# Patient Record
Sex: FEMALE | Race: OTHER | URBAN - METROPOLITAN AREA
[De-identification: names, ages, dates, MRNs, and addresses within clinical notes are randomized per-mention and may not be internally consistent; named-entity substitution may affect disease eponyms.]

---

## 2020-01-22 ENCOUNTER — OFFICE VISIT (OUTPATIENT)
Dept: GASTROENTEROLOGY | Facility: CLINIC | Age: 30
End: 2020-01-22
Payer: COMMERCIAL

## 2020-01-22 VITALS
BODY MASS INDEX: 24.92 KG/M2 | SYSTOLIC BLOOD PRESSURE: 115 MMHG | HEIGHT: 64 IN | HEART RATE: 63 BPM | WEIGHT: 146 LBS | DIASTOLIC BLOOD PRESSURE: 72 MMHG

## 2020-01-22 DIAGNOSIS — K58.1 IRRITABLE BOWEL SYNDROME WITH CONSTIPATION: Primary | ICD-10-CM

## 2020-01-22 PROCEDURE — 99203 OFFICE O/P NEW LOW 30 MIN: CPT | Performed by: INTERNAL MEDICINE

## 2020-01-22 NOTE — PATIENT INSTRUCTIONS
1.  Use the FODMAP diet as a guide regarding avoiding foods (FODMAPs) that may cause excessive gas. 2.  Please have your doctor obtain either a celiac panel (total IgA antibody and TTG IgA level) along with a TSH (thyroid test) if not done previously.

## 2020-01-23 NOTE — PROGRESS NOTES
HPI:    Patient ID: Juanita Paniagua is a 30year old female. HPI  The patient is self-referred on the advice of a coworker (CC). The patient has a very longstanding history of constipation and abdominal bloating.   Review of the [facility] feature She is oriented to person, place, and time. She appears well-developed and well-nourished. No distress. Healthy in appearance   HENT:   Head: Normocephalic and atraumatic. Mouth/Throat: No oropharyngeal exudate.    Eyes: Conjunctivae are normal. No scle [id number]

## 2021-07-27 ENCOUNTER — OFFICE VISIT (OUTPATIENT)
Dept: FAMILY MEDICINE CLINIC | Facility: CLINIC | Age: 31
End: 2021-07-27
Payer: COMMERCIAL

## 2021-07-27 VITALS
WEIGHT: 149 LBS | OXYGEN SATURATION: 98 % | HEIGHT: 64 IN | DIASTOLIC BLOOD PRESSURE: 60 MMHG | SYSTOLIC BLOOD PRESSURE: 110 MMHG | BODY MASS INDEX: 25.44 KG/M2 | HEART RATE: 60 BPM

## 2021-07-27 DIAGNOSIS — K58.1 IRRITABLE BOWEL SYNDROME WITH CONSTIPATION: ICD-10-CM

## 2021-07-27 DIAGNOSIS — Z00.00 WELLNESS EXAMINATION: Primary | ICD-10-CM

## 2021-07-27 LAB
ALBUMIN SERPL-MCNC: 4.1 G/DL (ref 3.4–5)
ALBUMIN/GLOB SERPL: 1.1 {RATIO} (ref 1–2)
ALP LIVER SERPL-CCNC: 75 U/L
ALT SERPL-CCNC: 39 U/L
ANION GAP SERPL CALC-SCNC: 10 MMOL/L (ref 0–18)
AST SERPL-CCNC: 23 U/L (ref 15–37)
BASOPHILS # BLD AUTO: 0.05 X10(3) UL (ref 0–0.2)
BASOPHILS NFR BLD AUTO: 0.6 %
BILIRUB SERPL-MCNC: 0.4 MG/DL (ref 0.1–2)
BUN BLD-MCNC: 11 MG/DL (ref 7–18)
BUN/CREAT SERPL: 14.9 (ref 10–20)
CALCIUM BLD-MCNC: 9 MG/DL (ref 8.5–10.1)
CHLORIDE SERPL-SCNC: 103 MMOL/L (ref 98–112)
CHOLEST SMN-MCNC: 206 MG/DL (ref ?–200)
CO2 SERPL-SCNC: 25 MMOL/L (ref 21–32)
CREAT BLD-MCNC: 0.74 MG/DL
DEPRECATED RDW RBC AUTO: 48.2 FL (ref 35.1–46.3)
EOSINOPHIL # BLD AUTO: 0.2 X10(3) UL (ref 0–0.7)
EOSINOPHIL NFR BLD AUTO: 2.3 %
ERYTHROCYTE [DISTWIDTH] IN BLOOD BY AUTOMATED COUNT: 14 % (ref 11–15)
EST. AVERAGE GLUCOSE BLD GHB EST-MCNC: 103 MG/DL (ref 68–126)
GLOBULIN PLAS-MCNC: 3.6 G/DL (ref 2.8–4.4)
GLUCOSE BLD-MCNC: 77 MG/DL (ref 70–99)
HBA1C MFR BLD HPLC: 5.2 % (ref ?–5.7)
HCT VFR BLD AUTO: 37.3 %
HDLC SERPL-MCNC: 54 MG/DL (ref 40–59)
HGB BLD-MCNC: 12.3 G/DL
IMM GRANULOCYTES # BLD AUTO: 0.02 X10(3) UL (ref 0–1)
IMM GRANULOCYTES NFR BLD: 0.2 %
LDLC SERPL CALC-MCNC: 99 MG/DL (ref ?–100)
LYMPHOCYTES # BLD AUTO: 2.7 X10(3) UL (ref 1–4)
LYMPHOCYTES NFR BLD AUTO: 30.5 %
M PROTEIN MFR SERPL ELPH: 7.7 G/DL (ref 6.4–8.2)
MCH RBC QN AUTO: 30.8 PG (ref 26–34)
MCHC RBC AUTO-ENTMCNC: 33 G/DL (ref 31–37)
MCV RBC AUTO: 93.5 FL
MONOCYTES # BLD AUTO: 0.61 X10(3) UL (ref 0.1–1)
MONOCYTES NFR BLD AUTO: 6.9 %
NEUTROPHILS # BLD AUTO: 5.27 X10 (3) UL (ref 1.5–7.7)
NEUTROPHILS # BLD AUTO: 5.27 X10(3) UL (ref 1.5–7.7)
NEUTROPHILS NFR BLD AUTO: 59.5 %
NONHDLC SERPL-MCNC: 152 MG/DL (ref ?–130)
OSMOLALITY SERPL CALC.SUM OF ELEC: 284 MOSM/KG (ref 275–295)
PATIENT FASTING Y/N/NP: NO
PATIENT FASTING Y/N/NP: NO
PLATELET # BLD AUTO: 343 10(3)UL (ref 150–450)
POTASSIUM SERPL-SCNC: 4 MMOL/L (ref 3.5–5.1)
RBC # BLD AUTO: 3.99 X10(6)UL
SODIUM SERPL-SCNC: 138 MMOL/L (ref 136–145)
TRIGL SERPL-MCNC: 316 MG/DL (ref 30–149)
TSI SER-ACNC: 1.33 MIU/ML (ref 0.36–3.74)
VLDLC SERPL CALC-MCNC: 53 MG/DL (ref 0–30)
WBC # BLD AUTO: 8.9 X10(3) UL (ref 4–11)

## 2021-07-27 PROCEDURE — 3074F SYST BP LT 130 MM HG: CPT | Performed by: FAMILY MEDICINE

## 2021-07-27 PROCEDURE — 99385 PREV VISIT NEW AGE 18-39: CPT | Performed by: FAMILY MEDICINE

## 2021-07-27 PROCEDURE — 80053 COMPREHEN METABOLIC PANEL: CPT | Performed by: FAMILY MEDICINE

## 2021-07-27 PROCEDURE — 85025 COMPLETE CBC W/AUTO DIFF WBC: CPT | Performed by: FAMILY MEDICINE

## 2021-07-27 PROCEDURE — 83036 HEMOGLOBIN GLYCOSYLATED A1C: CPT | Performed by: FAMILY MEDICINE

## 2021-07-27 PROCEDURE — 80061 LIPID PANEL: CPT | Performed by: FAMILY MEDICINE

## 2021-07-27 PROCEDURE — 84443 ASSAY THYROID STIM HORMONE: CPT | Performed by: FAMILY MEDICINE

## 2021-07-27 PROCEDURE — 3008F BODY MASS INDEX DOCD: CPT | Performed by: FAMILY MEDICINE

## 2021-07-27 PROCEDURE — 3078F DIAST BP <80 MM HG: CPT | Performed by: FAMILY MEDICINE

## 2021-07-27 RX ORDER — RIBOFLAVIN (VITAMIN B2) 100 MG
100 TABLET ORAL DAILY
COMMUNITY

## 2021-07-27 RX ORDER — MULTIVIT-MIN/IRON/FOLIC ACID/K 18-600-40
CAPSULE ORAL
COMMUNITY

## 2021-07-27 NOTE — PROGRESS NOTES
CC: Annual Physical Exam    HPI:   Serafin Justin is a 28year old female who presents for a complete physical exam.    HCM  -Diet:  Well-balanced.   -Exercise regularly  -Mental Health: denies any depression or anxiety sx  -Skin care:  no concerning lesi tonsil  demonstrates a smooth pink-tan homogenous architecture with no evidence of gross  tumor, hemorrhage or necrosis.   Representative sections of each tonsil are  submitted in cassettes 1 and 2.  (plc)     Kym Branch M.D./kiel           Signed:   Santos Darden (Exact Date)   SpO2 98%   BMI 25.58 kg/m²  Estimated body mass index is 25.58 kg/m² as calculated from the following:    Height as of this encounter: 5' 4\" (1.626 m). Weight as of this encounter: 149 lb (67.6 kg).    Wt Readings from Last 3 Encounters: PANEL;  Future  -     TSH W REFLEX TO FREE T4; Future  -     HEMOGLOBIN A1C; Future  -     Pt counseled with regards to diet and exercise.  -Obtain pap records    Irritable bowel syndrome with constipation  -supportive care and dietary changes discussed

## 2021-08-05 ENCOUNTER — TELEPHONE (OUTPATIENT)
Dept: FAMILY MEDICINE CLINIC | Facility: CLINIC | Age: 31
End: 2021-08-05

## 2021-08-05 NOTE — TELEPHONE ENCOUNTER
----- Message from Deya Sanabria MD sent at 7/30/2021  1:22 AM CDT -----  Please let her know  1) Your cholesterol panel especially your triglyceride is elevated.  This is reversible with a low fat diet and exercise + omega 3 fish oil    Rest are

## 2021-11-17 ENCOUNTER — OFFICE VISIT (OUTPATIENT)
Dept: FAMILY MEDICINE CLINIC | Facility: CLINIC | Age: 31
End: 2021-11-17
Payer: COMMERCIAL

## 2021-11-17 VITALS
TEMPERATURE: 98 F | WEIGHT: 150 LBS | BODY MASS INDEX: 26 KG/M2 | DIASTOLIC BLOOD PRESSURE: 72 MMHG | HEART RATE: 75 BPM | SYSTOLIC BLOOD PRESSURE: 110 MMHG | OXYGEN SATURATION: 98 %

## 2021-11-17 DIAGNOSIS — K58.1 IRRITABLE BOWEL SYNDROME WITH CONSTIPATION: Primary | ICD-10-CM

## 2021-11-17 DIAGNOSIS — Z12.4 PAP SMEAR FOR CERVICAL CANCER SCREENING: ICD-10-CM

## 2021-11-17 PROCEDURE — 99213 OFFICE O/P EST LOW 20 MIN: CPT | Performed by: FAMILY MEDICINE

## 2021-11-17 PROCEDURE — 3074F SYST BP LT 130 MM HG: CPT | Performed by: FAMILY MEDICINE

## 2021-11-17 PROCEDURE — 3078F DIAST BP <80 MM HG: CPT | Performed by: FAMILY MEDICINE

## 2021-11-17 PROCEDURE — 88175 CYTOPATH C/V AUTO FLUID REDO: CPT | Performed by: FAMILY MEDICINE

## 2021-11-17 PROCEDURE — 87624 HPV HI-RISK TYP POOLED RSLT: CPT | Performed by: FAMILY MEDICINE

## 2021-11-17 RX ORDER — DICYCLOMINE HCL 20 MG
20 TABLET ORAL EVERY 6 HOURS PRN
Qty: 20 TABLET | Refills: 1 | Status: SHIPPED | OUTPATIENT
Start: 2021-11-17

## 2021-11-17 RX ORDER — DOCUSATE SODIUM 100 MG/1
100 CAPSULE, LIQUID FILLED ORAL 2 TIMES DAILY PRN
Qty: 60 CAPSULE | Refills: 0 | Status: SHIPPED | OUTPATIENT
Start: 2021-11-17

## 2021-11-17 NOTE — PROGRESS NOTES
HPI:   Patient presents with:  Pap      Aliyah Mayen is a 28year old female presenting for:    Here for pap. On 11/2021 had +HPV. Recently went on vacation and now her IBS is flaring with bloating and constipation. Started a FOD MAP diet.       Res Absolute 5.27 1.50 - 7.70 x10(3) uL    Lymphocyte Absolute 2.70 1.00 - 4.00 x10(3) uL    Monocyte Absolute 0.61 0.10 - 1.00 x10(3) uL    Eosinophil Absolute 0.20 0.00 - 0.70 x10(3) uL    Basophil Absolute 0.05 0.00 - 0.20 x10(3) uL    Immature Granulocyte Procedure Laterality Date   • REMOVAL OF TONSILS,12+ Y/O       No Known Allergies   Social History:  Social History    Tobacco Use      Smoking status: Never Smoker      Smokeless tobacco: Never Used    Alcohol use: Yes      Comment: social    Drug use: Vagina: Normal.      Cervix: Normal.             ASSESSMENT AND PLAN:   Patient is a 28year old female who presents primarily presents for:    Diagnoses and all orders for this visit:    Irritable bowel syndrome with constipation  -     docusate sodi 07/27/2022  Influenza Vaccine Completed  COVID-19 Vaccine Completed  Pneumococcal Vaccine: Birth to Angélica Mandel MD

## 2021-11-19 ENCOUNTER — TELEPHONE (OUTPATIENT)
Dept: FAMILY MEDICINE CLINIC | Facility: CLINIC | Age: 31
End: 2021-11-19

## 2021-11-19 NOTE — TELEPHONE ENCOUNTER
We were unable to fax MR requests to [facility]. After 3 attempt we contact patient named above and she will obtain MR personally.

## 2021-11-22 NOTE — TELEPHONE ENCOUNTER
Patient states she went to [facility] office and they were giving her a hard time obtaining her MR records.

## 2021-11-27 ENCOUNTER — OFFICE VISIT (OUTPATIENT)
Dept: FAMILY MEDICINE CLINIC | Facility: CLINIC | Age: 31
End: 2021-11-27
Payer: COMMERCIAL

## 2021-11-27 VITALS
OXYGEN SATURATION: 100 % | TEMPERATURE: 98 F | DIASTOLIC BLOOD PRESSURE: 56 MMHG | HEIGHT: 64 IN | HEART RATE: 52 BPM | WEIGHT: 150 LBS | BODY MASS INDEX: 25.61 KG/M2 | SYSTOLIC BLOOD PRESSURE: 113 MMHG

## 2021-11-27 DIAGNOSIS — H69.83 EUSTACHIAN TUBE DYSFUNCTION, BILATERAL: Primary | ICD-10-CM

## 2021-11-27 PROCEDURE — 99213 OFFICE O/P EST LOW 20 MIN: CPT | Performed by: NURSE PRACTITIONER

## 2021-11-27 PROCEDURE — 3074F SYST BP LT 130 MM HG: CPT | Performed by: NURSE PRACTITIONER

## 2021-11-27 PROCEDURE — 3008F BODY MASS INDEX DOCD: CPT | Performed by: NURSE PRACTITIONER

## 2021-11-27 PROCEDURE — 3078F DIAST BP <80 MM HG: CPT | Performed by: NURSE PRACTITIONER

## 2021-11-27 NOTE — PATIENT INSTRUCTIONS
Sudafed  Flonase nasal spray  Ibuprofen  Common Middle Ear Problems  Middle ear problems may be caused by something that occurs at birth or right after birth (congenital). This may be an inherited condition.  Or it may be due to medicines or to a viral in outer or middle ear  This type of hearing loss can often be treated with medicine or surgery. Sensorineural hearing loss  This is the most common type of lifelong hearing loss. It occurs after damage to the inner ear.  It can also occur when there are prob

## 2021-11-27 NOTE — PROGRESS NOTES
CHIEF COMPLAINT:   Patient presents with:  Ear Pain: R      HPI:   Olivia Cornejo is a 28year old female who presents to clinic today with complaints of R ear discomfort. Has had for 3  days. Pain is described as more of an ache.   Patient denies histor auditory canals healthy. Right TM: clear gray, no bulging, no retraction, clear effusion, bony landmarks intact. Left TM: clear gray, no bulging, no retraction, clear effusion, bony landmarks intact.   NOSE: nostrils patent, no nasal discharge, nasal mu hearing loss:  · Conductive hearing loss. This is caused by anything that limits outside sound from getting into the inner ear. · Sensorineural hearing loss. This type affects the inner ear (cochlea) or the auditory nerve. · Mixed hearing loss.  This is a loss. Some metabolic disorders, such as diabetes, have been linked to it. If your hearing loss is unexplained, you may need tests to help find the cause.  These can include:  · Blood sugar level tests  · Complete blood count (CBC)  · Thyroid function tests

## 2022-05-20 ENCOUNTER — HOSPITAL ENCOUNTER (OUTPATIENT)
Age: 32
Discharge: HOME | End: 2022-05-20
Payer: COMMERCIAL

## 2022-05-20 VITALS
HEART RATE: 67 BPM | DIASTOLIC BLOOD PRESSURE: 78 MMHG | RESPIRATION RATE: 20 BRPM | TEMPERATURE: 99 F | SYSTOLIC BLOOD PRESSURE: 121 MMHG | OXYGEN SATURATION: 100 %

## 2022-05-20 DIAGNOSIS — H60.501 ACUTE OTITIS EXTERNA OF RIGHT EAR, UNSPECIFIED TYPE: Primary | ICD-10-CM

## 2022-05-20 RX ORDER — CIPROFLOXACIN AND DEXAMETHASONE 3; 1 MG/ML; MG/ML
4 SUSPENSION/ DROPS AURICULAR (OTIC) 2 TIMES DAILY
Qty: 1 EACH | Refills: 0 | Status: SHIPPED | OUTPATIENT
Start: 2022-05-20 | End: 2022-05-27

## 2022-05-20 NOTE — ED INITIAL ASSESSMENT (HPI)
Pt here with complaints of right ear pain that started 2 days ago , pt denies any fevers or hearing disturbance

## 2023-06-02 ENCOUNTER — OFFICE VISIT (OUTPATIENT)
Dept: INTERNAL MEDICINE CLINIC | Facility: CLINIC | Age: 33
End: 2023-06-02
Payer: COMMERCIAL

## 2023-06-02 VITALS
HEART RATE: 60 BPM | SYSTOLIC BLOOD PRESSURE: 102 MMHG | BODY MASS INDEX: 24.41 KG/M2 | OXYGEN SATURATION: 98 % | HEIGHT: 64.37 IN | WEIGHT: 143 LBS | DIASTOLIC BLOOD PRESSURE: 70 MMHG

## 2023-06-02 DIAGNOSIS — N89.8 VAGINAL DISCHARGE: ICD-10-CM

## 2023-06-02 DIAGNOSIS — Z00.00 WELLNESS EXAMINATION: Primary | ICD-10-CM

## 2023-06-02 PROCEDURE — 3008F BODY MASS INDEX DOCD: CPT | Performed by: FAMILY MEDICINE

## 2023-06-02 PROCEDURE — 87808 TRICHOMONAS ASSAY W/OPTIC: CPT | Performed by: FAMILY MEDICINE

## 2023-06-02 PROCEDURE — 99395 PREV VISIT EST AGE 18-39: CPT | Performed by: FAMILY MEDICINE

## 2023-06-02 PROCEDURE — 87106 FUNGI IDENTIFICATION YEAST: CPT | Performed by: FAMILY MEDICINE

## 2023-06-02 PROCEDURE — 87205 SMEAR GRAM STAIN: CPT | Performed by: FAMILY MEDICINE

## 2023-06-02 PROCEDURE — 99212 OFFICE O/P EST SF 10 MIN: CPT | Performed by: FAMILY MEDICINE

## 2023-06-02 PROCEDURE — 3078F DIAST BP <80 MM HG: CPT | Performed by: FAMILY MEDICINE

## 2023-06-02 PROCEDURE — 3074F SYST BP LT 130 MM HG: CPT | Performed by: FAMILY MEDICINE

## 2023-06-04 LAB
GENITAL VAGINOSIS SCREEN: NEGATIVE
TRICHOMONAS SCREEN: NEGATIVE

## 2024-03-04 ENCOUNTER — HOSPITAL ENCOUNTER (OUTPATIENT)
Age: 34
Discharge: HOME | End: 2024-03-04
Payer: COMMERCIAL

## 2024-03-04 VITALS
RESPIRATION RATE: 20 BRPM | DIASTOLIC BLOOD PRESSURE: 54 MMHG | OXYGEN SATURATION: 98 % | HEART RATE: 55 BPM | TEMPERATURE: 97 F | SYSTOLIC BLOOD PRESSURE: 118 MMHG

## 2024-03-04 DIAGNOSIS — S27.818A ABRASION OF ESOPHAGUS, INITIAL ENCOUNTER: Primary | ICD-10-CM

## 2024-03-04 RX ORDER — LIDOCAINE HYDROCHLORIDE 20 MG/ML
5 SOLUTION OROPHARYNGEAL
Qty: 100 ML | Refills: 0 | Status: SHIPPED | OUTPATIENT
Start: 2024-03-04

## 2024-03-04 NOTE — ED PROVIDER NOTES
Patient Seen in: [facility]      History     Chief Complaint   Patient presents with    Foreign Body     Stated Complaint: Sore Throat; Cough    Subjective:   HPI  Patient is a 33-year-old female who presents to the immediate care center with concern for scratchy sensation to the right side of her throat.  She was swallowing a pill yesterday and felt it was caught in her throat.  She immediately induced vomiting and was able to visualize the intact pill in her emesis.  Since that time, she has had a scratchy like sensation to the right side of her throat.  She is able to swallow without difficulty.  She is eating and drinking today without difficulty.  She has had no cough or stridor throughout.          Objective:   Past Medical History:   Diagnosis Date    Chronic tonsillitis               Past Surgical History:   Procedure Laterality Date    REMOVAL OF TONSILS,12+ Y/O                  Social History     Socioeconomic History    Marital status: Single   Tobacco Use    Smoking status: Never    Smokeless tobacco: Never   Substance and Sexual Activity    Alcohol use: Yes     Comment: social    Drug use: No              Review of Systems   Constitutional:  Negative for chills and fever.   HENT:  Positive for sore throat.    Respiratory:  Negative for cough, shortness of breath, wheezing and stridor.    Skin:  Negative for rash.   Neurological:  Negative for weakness.       Positive for stated complaint: Sore Throat; Cough  Other systems are as noted in HPI.  Constitutional and vital signs reviewed.      All other systems reviewed and negative except as noted above.    Physical Exam     ED Triage Vitals [03/04/24 1645]   /54   Pulse 55   Resp 20   Temp 97.3 °F (36.3 °C)   Temp src Temporal   SpO2 98 %   O2 Device None (Room air)       Current:/54   Pulse 55   Temp 97.3 °F (36.3 °C) (Temporal)   Resp 20   LMP 05/18/2023 (Approximate)   SpO2 98%         Physical Exam  Vitals and nursing  note reviewed.   HENT:      Mouth/Throat:      Mouth: Mucous membranes are moist.      Pharynx: Uvula midline. No pharyngeal swelling, posterior oropharyngeal erythema or uvula swelling.      Tonsils: No tonsillar exudate or tonsillar abscesses. 0 on the right. 0 on the left.   Eyes:      Conjunctiva/sclera: Conjunctivae normal.   Pulmonary:      Effort: Pulmonary effort is normal. No respiratory distress.   Musculoskeletal:      Cervical back: Neck supple.   Skin:     General: Skin is warm and dry.   Neurological:      Mental Status: She is alert and oriented to person, place, and time.   Psychiatric:         Behavior: Behavior normal.               ED Course   Labs Reviewed - No data to display                   MDM      Patient was offered prescription for viscous lidocaine to relieve her symptoms.  She asked for this to be printed so that she can decide later whether or not to fill the prescription.  She was advised that the irritation she is feeling is likely an abrasion and deep within the oropharynx (that is not visible) or potentially the esophagus.  She was encouraged to eat soft foods over the next couple of days, take the viscous lidocaine as directed, follow-up with her primary care provider by the end of the week if her symptoms or not resolved.  If it anytime symptoms worsen: She has inability to swallow, stridor, persistent cough, hematemesis or hemoptysis she should go directly to the emergency department for further evaluation and treatment.  She states understanding agrees with plan.                                   Medical Decision Making  Differential diagnoses considered today include, but are not exclusive of: Esophageal abrasion; globus sensation; acute pharyngitis    Problems Addressed:  Abrasion of esophagus, initial encounter: self-limited or minor problem    Risk  OTC drugs.  Prescription drug management.        Disposition and Plan     Clinical Impression:  1. Abrasion of esophagus,  initial encounter         Disposition:  Discharge  3/4/2024  5:04 pm    Follow-up:  Kia Thompson MD  [address]  [facility] [address]  [facility] [id number] [phone]    Schedule an appointment as soon as possible for a visit in 1 week  As needed    [facility]  [address]  [facility] [id number] [phone]  Go to   If symptoms worsen          Medications Prescribed:  Current Discharge Medication List        START taking these medications    Details   Lidocaine Viscous HCl 2 % Mouth/Throat Solution Take 5 mL by mouth every 3 (three) hours as needed for Pain.  Qty: 100 mL, Refills: 0

## 2024-03-04 NOTE — ED INITIAL ASSESSMENT (HPI)
Pt states she had a pill stuck in her throat yesterday. Pt states she made herself vomit to get pill out-pill came out. Today pt still with irration in throat, sob. Able to speak complete sentences.

## 2024-06-03 ENCOUNTER — TELEPHONE (OUTPATIENT)
Dept: INTERNAL MEDICINE CLINIC | Facility: CLINIC | Age: 34
End: 2024-06-03

## 2024-06-03 DIAGNOSIS — Z00.00 HEALTHCARE MAINTENANCE: Primary | ICD-10-CM

## 2024-06-03 NOTE — TELEPHONE ENCOUNTER
Pt is calling stating that has appointment on 06/18 and would like to get blood work done before appointment. Theres some orders put in but want to know if does are all the orders pt needs.       Please call and advise

## 2024-06-12 ENCOUNTER — LAB ENCOUNTER (OUTPATIENT)
Dept: LAB | Facility: HOSPITAL | Age: 34
End: 2024-06-12
Attending: FAMILY MEDICINE
Payer: COMMERCIAL

## 2024-06-12 LAB
ALBUMIN SERPL-MCNC: 4.4 G/DL (ref 3.2–4.8)
ALBUMIN/GLOB SERPL: 1.8 {RATIO} (ref 1–2)
ALP LIVER SERPL-CCNC: 68 U/L
ALT SERPL-CCNC: 16 U/L
ANION GAP SERPL CALC-SCNC: 5 MMOL/L (ref 0–18)
AST SERPL-CCNC: 17 U/L (ref ?–34)
BASOPHILS # BLD AUTO: 0.03 X10(3) UL (ref 0–0.2)
BASOPHILS NFR BLD AUTO: 0.4 %
BILIRUB SERPL-MCNC: 0.6 MG/DL (ref 0.3–1.2)
BUN BLD-MCNC: 10 MG/DL (ref 9–23)
BUN/CREAT SERPL: 13.5 (ref 10–20)
CALCIUM BLD-MCNC: 9.1 MG/DL (ref 8.7–10.4)
CHLORIDE SERPL-SCNC: 108 MMOL/L (ref 98–112)
CHOLEST SERPL-MCNC: 209 MG/DL (ref ?–200)
CO2 SERPL-SCNC: 27 MMOL/L (ref 21–32)
CREAT BLD-MCNC: 0.74 MG/DL
DEPRECATED RDW RBC AUTO: 42.9 FL (ref 35.1–46.3)
EGFRCR SERPLBLD CKD-EPI 2021: 109 ML/MIN/1.73M2 (ref 60–?)
EOSINOPHIL # BLD AUTO: 0.3 X10(3) UL (ref 0–0.7)
EOSINOPHIL NFR BLD AUTO: 3.7 %
ERYTHROCYTE [DISTWIDTH] IN BLOOD BY AUTOMATED COUNT: 13 % (ref 11–15)
EST. AVERAGE GLUCOSE BLD GHB EST-MCNC: 100 MG/DL (ref 68–126)
FASTING PATIENT LIPID ANSWER: YES
FASTING STATUS PATIENT QL REPORTED: YES
GLOBULIN PLAS-MCNC: 2.5 G/DL (ref 2–3.5)
GLUCOSE BLD-MCNC: 87 MG/DL (ref 70–99)
HBA1C MFR BLD: 5.1 % (ref ?–5.7)
HCT VFR BLD AUTO: 38 %
HDLC SERPL-MCNC: 55 MG/DL (ref 40–59)
HGB BLD-MCNC: 13.2 G/DL
IMM GRANULOCYTES # BLD AUTO: 0.03 X10(3) UL (ref 0–1)
IMM GRANULOCYTES NFR BLD: 0.4 %
LDLC SERPL CALC-MCNC: 129 MG/DL (ref ?–100)
LYMPHOCYTES # BLD AUTO: 2.66 X10(3) UL (ref 1–4)
LYMPHOCYTES NFR BLD AUTO: 33 %
MCH RBC QN AUTO: 31.7 PG (ref 26–34)
MCHC RBC AUTO-ENTMCNC: 34.7 G/DL (ref 31–37)
MCV RBC AUTO: 91.1 FL
MONOCYTES # BLD AUTO: 0.51 X10(3) UL (ref 0.1–1)
MONOCYTES NFR BLD AUTO: 6.3 %
NEUTROPHILS # BLD AUTO: 4.53 X10 (3) UL (ref 1.5–7.7)
NEUTROPHILS # BLD AUTO: 4.53 X10(3) UL (ref 1.5–7.7)
NEUTROPHILS NFR BLD AUTO: 56.2 %
NONHDLC SERPL-MCNC: 154 MG/DL (ref ?–130)
OSMOLALITY SERPL CALC.SUM OF ELEC: 288 MOSM/KG (ref 275–295)
PLATELET # BLD AUTO: 316 10(3)UL (ref 150–450)
POTASSIUM SERPL-SCNC: 3.9 MMOL/L (ref 3.5–5.1)
PROT SERPL-MCNC: 6.9 G/DL (ref 5.7–8.2)
RBC # BLD AUTO: 4.17 X10(6)UL
SODIUM SERPL-SCNC: 140 MMOL/L (ref 136–145)
TRIGL SERPL-MCNC: 142 MG/DL (ref 30–149)
TSI SER-ACNC: 1.52 MIU/ML (ref 0.55–4.78)
VLDLC SERPL CALC-MCNC: 26 MG/DL (ref 0–30)
WBC # BLD AUTO: 8.1 X10(3) UL (ref 4–11)

## 2024-06-12 PROCEDURE — 84443 ASSAY THYROID STIM HORMONE: CPT | Performed by: FAMILY MEDICINE

## 2024-06-12 PROCEDURE — 36415 COLL VENOUS BLD VENIPUNCTURE: CPT | Performed by: FAMILY MEDICINE

## 2024-06-12 PROCEDURE — 85025 COMPLETE CBC W/AUTO DIFF WBC: CPT | Performed by: FAMILY MEDICINE

## 2024-06-12 PROCEDURE — 80061 LIPID PANEL: CPT | Performed by: FAMILY MEDICINE

## 2024-06-12 PROCEDURE — 80053 COMPREHEN METABOLIC PANEL: CPT | Performed by: FAMILY MEDICINE

## 2024-06-12 PROCEDURE — 83036 HEMOGLOBIN GLYCOSYLATED A1C: CPT | Performed by: FAMILY MEDICINE

## 2024-06-18 ENCOUNTER — OFFICE VISIT (OUTPATIENT)
Dept: INTERNAL MEDICINE CLINIC | Facility: CLINIC | Age: 34
End: 2024-06-18

## 2024-06-18 VITALS
HEART RATE: 68 BPM | BODY MASS INDEX: 25.61 KG/M2 | SYSTOLIC BLOOD PRESSURE: 104 MMHG | DIASTOLIC BLOOD PRESSURE: 62 MMHG | HEIGHT: 64.37 IN | TEMPERATURE: 98 F | OXYGEN SATURATION: 96 % | WEIGHT: 150 LBS

## 2024-06-18 DIAGNOSIS — Z00.00 WELLNESS EXAMINATION: Primary | ICD-10-CM

## 2024-06-18 DIAGNOSIS — K21.9 GASTROESOPHAGEAL REFLUX DISEASE, UNSPECIFIED WHETHER ESOPHAGITIS PRESENT: ICD-10-CM

## 2024-06-18 PROCEDURE — 99395 PREV VISIT EST AGE 18-39: CPT | Performed by: FAMILY MEDICINE

## 2024-06-19 NOTE — PROGRESS NOTES
CC: Annual Physical Exam    HPI:   Darby Khan is a 34 year old female who presents for a complete physical exam.    HCM  -Diet:  Well-balanced.   -Exercise active  -Mental Health: denies any depression or anxiety sx  -Skin care:  no concerning lesions/moles  -Menses: regular cycles with slight variation of few days ealry or late. No heavy bleeding or cramping      Intermittent reflux. Improves with diet.    Wt Readings from Last 6 Encounters:   06/18/24 150 lb (68 kg)   06/02/23 143 lb (64.9 kg)   11/27/21 150 lb (68 kg)   11/17/21 150 lb (68 kg)   07/27/21 149 lb (67.6 kg)   01/22/20 146 lb (66.2 kg)     Body mass index is 25.45 kg/m².     Results for orders placed or performed in visit on 06/03/24   Comp Metabolic Panel (14)   Result Value Ref Range    Glucose 87 70 - 99 mg/dL    Sodium 140 136 - 145 mmol/L    Potassium 3.9 3.5 - 5.1 mmol/L    Chloride 108 98 - 112 mmol/L    CO2 27.0 21.0 - 32.0 mmol/L    Anion Gap 5 0 - 18 mmol/L    BUN 10 9 - 23 mg/dL    Creatinine 0.74 0.55 - 1.02 mg/dL    BUN/CREA Ratio 13.5 10.0 - 20.0    Calcium, Total 9.1 8.7 - 10.4 mg/dL    Calculated Osmolality 288 275 - 295 mOsm/kg    eGFR-Cr 109 >=60 mL/min/1.73m2    ALT 16 10 - 49 U/L    AST 17 <=34 U/L    Alkaline Phosphatase 68 37 - 98 U/L    Bilirubin, Total 0.6 0.3 - 1.2 mg/dL    Total Protein 6.9 5.7 - 8.2 g/dL    Albumin 4.4 3.2 - 4.8 g/dL    Globulin  2.5 2.0 - 3.5 g/dL    A/G Ratio 1.8 1.0 - 2.0    Patient Fasting for CMP? Yes    Hemoglobin A1C   Result Value Ref Range    HgbA1C 5.1 <5.7 %    Estimated Average Glucose 100 68 - 126 mg/dL   Lipid Panel   Result Value Ref Range    Cholesterol, Total 209 (H) <200 mg/dL    HDL Cholesterol 55 40 - 59 mg/dL    Triglycerides 142 30 - 149 mg/dL    LDL Cholesterol 129 (H) <100 mg/dL    VLDL 26 0 - 30 mg/dL    Non HDL Chol 154 (H) <130 mg/dL    Patient Fasting for Lipid? Yes    TSH W Reflex To Free T4   Result Value Ref Range    TSH 1.521 0.550 - 4.780 mIU/mL   CBC W/ DIFFERENTIAL    Result Value Ref Range    WBC 8.1 4.0 - 11.0 x10(3) uL    RBC 4.17 3.80 - 5.30 x10(6)uL    HGB 13.2 12.0 - 16.0 g/dL    HCT 38.0 35.0 - 48.0 %    MCV 91.1 80.0 - 100.0 fL    MCH 31.7 26.0 - 34.0 pg    MCHC 34.7 31.0 - 37.0 g/dL    RDW-SD 42.9 35.1 - 46.3 fL    RDW 13.0 11.0 - 15.0 %    .0 150.0 - 450.0 10(3)uL    Neutrophil Absolute Prelim 4.53 1.50 - 7.70 x10 (3) uL    Neutrophil Absolute 4.53 1.50 - 7.70 x10(3) uL    Lymphocyte Absolute 2.66 1.00 - 4.00 x10(3) uL    Monocyte Absolute 0.51 0.10 - 1.00 x10(3) uL    Eosinophil Absolute 0.30 0.00 - 0.70 x10(3) uL    Basophil Absolute 0.03 0.00 - 0.20 x10(3) uL    Immature Granulocyte Absolute 0.03 0.00 - 1.00 x10(3) uL    Neutrophil % 56.2 %    Lymphocyte % 33.0 %    Monocyte % 6.3 %    Eosinophil % 3.7 %    Basophil % 0.4 %    Immature Granulocyte % 0.4 %       Current Outpatient Medications   Medication Sig Dispense Refill    Probiotic Product (PROBIOTIC DAILY OR) Take by mouth.      Ascorbic Acid (VITAMIN C) 100 MG Oral Tab Take 1 tablet (100 mg total) by mouth daily.      B Complex Vitamins (B COMPLEX 50 OR) Take by mouth.        No Known Allergies   Past Medical History:    Chronic tonsillitis      Past Surgical History:   Procedure Laterality Date    Removal of tonsils,12+ y/o        Family History   Problem Relation Age of Onset    Diabetes Mother     Diabetes Maternal Grandmother     High Cholesterol Father     Heart Disease Maternal Grandfather       Social History:   Social History     Socioeconomic History    Marital status: Single   Tobacco Use    Smoking status: Never    Smokeless tobacco: Never   Substance and Sexual Activity    Alcohol use: Yes     Comment: social    Drug use: No          REVIEW OF SYSTEMS:   Review of Systems   Constitutional:  Negative for fatigue and fever.   Respiratory:  Negative for cough and shortness of breath.    Cardiovascular:  Negative for chest pain, palpitations and leg swelling.   Gastrointestinal:  Negative for  abdominal pain, constipation, diarrhea and vomiting.   Musculoskeletal:  Negative for arthralgias.   Neurological:  Negative for headaches.            PHYSICAL EXAM:   /62   Pulse 68   Temp 98.4 °F (36.9 °C)   Ht 5' 4.37\" (1.635 m)   Wt 150 lb (68 kg)   LMP 06/07/2024 (Approximate)   SpO2 96%   BMI 25.45 kg/m²  Estimated body mass index is 25.45 kg/m² as calculated from the following:    Height as of this encounter: 5' 4.37\" (1.635 m).    Weight as of this encounter: 150 lb (68 kg).     Wt Readings from Last 3 Encounters:   06/18/24 150 lb (68 kg)   06/02/23 143 lb (64.9 kg)   11/27/21 150 lb (68 kg)       Physical Exam  Vitals reviewed.   Constitutional:       General: She is not in acute distress.     Appearance: She is well-developed.   HENT:      Head: Normocephalic.      Right Ear: Tympanic membrane and external ear normal.      Left Ear: Tympanic membrane and external ear normal.   Eyes:      Conjunctiva/sclera: Conjunctivae normal.      Pupils: Pupils are equal, round, and reactive to light.   Neck:      Thyroid: No thyromegaly.   Cardiovascular:      Rate and Rhythm: Normal rate and regular rhythm.      Heart sounds: Normal heart sounds. No murmur heard.  Pulmonary:      Effort: Pulmonary effort is normal. No respiratory distress.      Breath sounds: Normal breath sounds.   Abdominal:      General: Bowel sounds are normal. There is no distension.      Palpations: Abdomen is soft.      Tenderness: There is no abdominal tenderness.   Musculoskeletal:         General: Normal range of motion.      Cervical back: Normal range of motion and neck supple.      Right lower leg: No edema.      Left lower leg: No edema.   Skin:     Findings: No rash.   Neurological:      General: No focal deficit present.      Cranial Nerves: No cranial nerve deficit.           ASSESSMENT AND PLAN:   Darby Khan is a 34 year old female who presents for a complete physical exam.      Health maintenance, will check : No  orders of the defined types were placed in this encounter.      Diagnoses and all orders for this visit:    Wellness examination    Gastroesophageal reflux disease, unspecified whether esophagitis present      -     Pt reassured and all questions answered.  -     Age/sex specific preventive measures/immunizations reviewed and discussed with pt.  -     Pt counseled with regards to diet and exercise.      -dietary modifications and avoiding exacerbating foods advised      COVID-19 Vaccine(3 - Booster for Pfizer series) due on 06/04/2021  Annual Physical due on 07/27/2022  Annual Depression Screening due on 01/01/2023      The patient indicates understanding of these issues and agrees to the plan.  Return if symptoms worsen or fail to improve.  Reasurrance and education provided. All questions answered. Red flags/ ER precautions discussed.       Spent 30 minutes (15 in preventative and  15 in acute/chronic)  including chart review, reviewing appropriate medical history, evaluating patient, discussing treatment options, counseling and education (diet and exercise), ordering appropriate diagnostic tests and completing documentation.

## 2024-08-13 ENCOUNTER — OFFICE VISIT (OUTPATIENT)
Dept: OBGYN CLINIC | Facility: CLINIC | Age: 34
End: 2024-08-13

## 2024-08-13 VITALS
BODY MASS INDEX: 25 KG/M2 | HEART RATE: 48 BPM | SYSTOLIC BLOOD PRESSURE: 104 MMHG | WEIGHT: 149 LBS | DIASTOLIC BLOOD PRESSURE: 69 MMHG

## 2024-08-13 DIAGNOSIS — N89.8 VAGINAL DISCHARGE: Primary | ICD-10-CM

## 2024-08-13 PROCEDURE — 99203 OFFICE O/P NEW LOW 30 MIN: CPT | Performed by: STUDENT IN AN ORGANIZED HEALTH CARE EDUCATION/TRAINING PROGRAM

## 2024-08-13 RX ORDER — METRONIDAZOLE 7.5 MG/G
1 GEL VAGINAL NIGHTLY
Qty: 5 EACH | Refills: 0 | Status: SHIPPED | OUTPATIENT
Start: 2024-08-13 | End: 2024-08-18

## 2024-08-13 NOTE — PROGRESS NOTES
[facility]  Obstetrics and Gynecology  Gyne Problem Visit      Darby Khan is a 34 year old female  presenting with concerns of abnormal clear/yellow vaginal discharge with odor and irritation for the last two weeks. Was seen by PCP at that time and was put on antibiotics. Patient took full course Flagyl with no relief of symptoms. She has tried boric acid suppositories with no relief as well. She has had BV infections in the past but usually are resolved with antibiotics. No new sexual partner. Recent negative STD screening. She is not on any form of birth control.    Patient's last menstrual period was 2024 (approximate).     Pap:   Contraception: None    OBSTETRICS HISTORY:  OB History    Para Term  AB Living   0 0 0 0 0 0   SAB IAB Ectopic Multiple Live Births   0 0 0 0 0       GYNE HISTORY:  Hx Prior Abnormal Pap: No   Menarche: 15y/o (2024 10:04 AM)  Period Cycle (Days): 28days (2024 10:04 AM)  Period Duration (Days): 5days (2024 10:04 AM)  Period Flow: Moderate (2024 10:04 AM)  Use of Birth Control (if yes, specify type): None (2024 10:04 AM)  Hx Prior Abnormal Pap: No (2024 10:04 AM)        Latest Ref Rng & Units 2021     5:13 PM   RECENT PAP RESULTS   Thinprep Pap Negative for intraepithelial lesion or malignancy Negative for intraepithelial lesion or malignancy    HPV Negative Negative          History   Sexual Activity    Sexual activity: Not on file       MEDICAL HISTORY:  Past Medical History:   Diagnosis Date    Chronic tonsillitis      Past Surgical History:   Procedure Laterality Date    Removal of tonsils,13+ y/o         SOCIAL HISTORY:  Social History     Socioeconomic History    Marital status: Single     Spouse name: Not on file    Number of children: Not on file    Years of education: Not on file    Highest education level: Not on file   Occupational History    Not on file   Tobacco Use    Smoking status: Never    Smokeless  tobacco: Never   Substance and Sexual Activity    Alcohol use: Yes     Comment: social    Drug use: No    Sexual activity: Not on file   Other Topics Concern    Not on file   Social History Narrative    Not on file     Social Determinants of Health     Financial Resource Strain: Not on file   Food Insecurity: Not on file   Transportation Needs: Not on file   Physical Activity: Not on file   Stress: Not on file   Social Connections: Not on file   Housing Stability: Not on file       MEDICATIONS:    Current Outpatient Medications:     metroNIDAZOLE 0.75 % Vaginal Gel, Place 1 Applicatorful vaginally nightly for 5 days., Disp: 5 each, Rfl: 0    Probiotic Product (PROBIOTIC DAILY OR), Take by mouth., Disp: , Rfl:     Ascorbic Acid (VITAMIN C) 100 MG Oral Tab, Take 1 tablet (100 mg total) by mouth daily., Disp: , Rfl:     B Complex Vitamins (B COMPLEX 50 OR), Take by mouth., Disp: , Rfl:     ALLERGIES:  No Known Allergies      REVIEW OF SYSTEMS:  Review of Systems   Constitutional:  Negative for appetite change, chills and fever.   Gastrointestinal:  Negative for abdominal pain, constipation, diarrhea, nausea and vomiting.   Genitourinary:  Positive for vaginal discharge and vaginal pain. Negative for difficulty urinating, dysuria, flank pain, frequency, genital sores, hematuria, menstrual problem, pelvic pain, urgency and vaginal bleeding.   Skin:  Negative for rash.   Neurological:  Negative for dizziness, light-headedness and headaches.       PHYSICAL EXAM:  /69 (BP Location: Right arm, Patient Position: Sitting, Cuff Size: adult)   Pulse (!) 48   Wt 149 lb (67.6 kg)   LMP 07/26/2024 (Approximate)   BMI 25.28 kg/m²     GENERAL: well developed, well nourished, in no apparent distress  ABDOMEN: Soft, non distended; non tender, no masses  GYNE/: External Genitalia: Normal appearing, no lesions. Urethral meatus appear wnl, no abnormal discharge or lesions noted.          Bladder: well supported, urethra wnl,  no lesions or fissures                     Vagina: normal pink mucosa, no lesions, normal clear discharge.                      Uterus: mobile, non tender, normal size                     Cervix: Normal                      Adnexa: non tender, no masses, normal size     ASSESSMENT:       ICD-10-CM    1. Vaginal discharge  N89.8 Vaginitis Vaginosis PCR Panel     Vaginitis Vaginosis PCR Panel          Plan:  - Vaginosis panel collected to see if infection is persistent. If still positive for BV will try metrogel. Discussed continued use of boric acid suppository to aid in maintaining vaginal pH. Vaginal hygiene discussed with patient.      Requested Prescriptions     Signed Prescriptions Disp Refills    metroNIDAZOLE 0.75 % Vaginal Gel 5 each 0     Sig: Place 1 Applicatorful vaginally nightly for 5 days.       FILIPE AGGARWAL PA-C  10:14 AM  8/13/2024

## 2024-08-13 NOTE — PATIENT INSTRUCTIONS
Guidelines for Vulvar Skin Care    NOTE: The goal is to promote healthy vulvar skin. This is done by decreasing and removing chemicals, moisture, or rubbing (friction). Products listed below have been suggested for use because of their past success in helping to decrease or relieve vulvar/ vaginal burning, irritation and itching.    LAUNDRY PRODUCTS  1. Use a detergent free of dyes, enzymes and perfumes (such as ALL-Free and Clear) on all clothing. Use 1/3 to 1/2 the suggested amount per load.  2. Do not use a fabric softener in the washer or dryer, even those advertised as \"free\".  If you use a shared dryer or laundromat, you must hang dry your underwear, towels, and    any other clothing that come in contact with your vulva.   3. Stain removing products (including bleach). Soak and rinse in clear water all           underwear and towels on which you have used a stain-removing product. Then wash in your regular washing cycle. This removes as much of the product as possible.  White vinegar, 1/4 - 1/3 cup per laundry load, can be used to freshen clothing and remove oils.     CLOTHING  1. Wear white all cotton underwear, not nylon with a cotton crotch. Cotton allows air in and moisture out. Do not wear underwear when sleeping at night. Loose fitting boxers or pajama bottoms are fine.  2. Avoid pantyhose. If you must wear them, either cut out the matteo crotch (if you cut   out the crotch, be sure to leave about 1/4 to 1/2 inch of fabric from the seam to prevent       running), or wear thigh high hose. Many stores now carry thigh high nylons.   3. Avoid tight clothing, especially clothing made of synthetic fabrics. Remove wet                 bathing and exercise clothing as soon as you can.    BATHING AND HYGIENE  1. Do not use bath soaps, lotions, gels, etc., which contain perfumes. These may       smell nice but can be irritating. This includes many baby products and feminine hygiene   products marked \"gentle\" or  \"mild\". Dove for Sensitive Skin, Neutrogena, Basis,    Aveeno, or Pears are the soaps we suggest. Do not use soap directly on the vulvar skin.               Just warm water and your hand will keep the vulvar area clean without irritating the skin.  2. Do not use bubble bath, bath salts and scented oils. You may apply a neutral (unscented, non-perfumed) oil or lotion to damp skin after getting out of the tub or shower. Do not apply lotion directly to the vulva.  3. Do not scrub vulvar skin with a washcloth. Washing with your hand and warm water is enough for good cleaning.  4. Pat dry rather than rubbing with a towel. Or use a hairdryer on a cool setting to dry the vulva.  5. Baking Soda soaks. Soak in lukewarm (not hot) bath water with 4-5 tablespoons of baking soda to help soothe vulvar itching and burning. Soak 1 to 3 times a day for 5-10 minutes. If you are using a sitz bath, use 1-2 teaspoons of baking soda.  6. Use white, unscented toilet paper. If paper has a perfumed scent or lotion, avoid using it.  7. Do not use feminine hygiene sprays, perfumes, adult, or baby wipes. If urine causes burning of the skin, pour lukewarm water over the vulva while urinating. Pat dry rather than wiping.  8. Do not use deodorized pads and tampons. Tampons may be used when the blood flow is heavy enough to soak one tampon in four hours or less. Tampons are safe for most women, but wearing them too long or when the blood flow is light may result in vaginal infection, increased discharge, odor, or toxic shock syndrome. Also, use only pads that have a cotton liner that comes in contact with your skin.  9. Do not use over the counter creams or ointments, except A&D or zinc oxide ointment. Ask your health provider first. When buying ointments, be sure that they are paraben and fragrance-free.  10. Small amounts of A&D Ointment, olive oil, vegetable oil or zinc oxide may be applied to your vulva as often as needed to protect the skin.  These products also help to decrease skin irritation during your period and when you urinate. If wearing wool causes you to itch, do not use A&D ointment, as it contains lanolin.  11. DO NOT DOUCHE. Baking soda soaks or rinsing with warm water will help rinse away extra discharge and help with odor.  12. DO NOT SHAVE or use hair removing products on the vulvar area. You may use scissors to trim the pubic hair close to the vulva.  13. Some women may have problems with chronic dampness. Keeping dry is important.   Do not wear pads on a daily basis.  Choose cotton fabrics whenever you can.  Keep an extra pair of underwear with you in a small bag and change if you become damp during the day at work/school.  Gold Bond Powder or Zeosorb Powder may be applied to the vulva and groin area one to two times per day to help absorb moisture. Do not use powders that contain cornstarch.   14. Using a lubricant may help dryness and irritation during intercourse. Use a small amount of a pure vegetable oil (solid or liquid) or olive oil. These oils contain no chemicals to irritate vulvar/vaginal skin. Also, these oils will rinse away with water and will not increase your chances of infection. Water-based lubricants tend to dry out before intercourse is over and may also contain chemicals that can irritate your vulvar skin. It may be helpful to use a non-lubricated, non-spermicidal condom, and use vegetable oil as the lubricant. This will help keep the semen off the skin, which can decrease burning and irritation after intercourse.    BIRTH CONTROL OPTIONS   1. The new low-dose oral birth control pills do not increase your chances of getting a yeast infection.   2. Lubricated condoms, contraceptive jellies, creams, or sponges may cause itching and burning. Ask your health care provider for help.   3. The use of latex condoms with a vegetable oil as a lubricant (#14 above) is suggested to protect your skin. Petroleum-based lubricants  may affect the integrity of condoms when used for birth control or prevention of sexually transmitted diseases. Our experience has not found this to be a problem with vegetable-based oils. However, the Centers for Disease Control recommends that condoms not be used with any oil based lubricants for birth control or prevention of sexually transmitted disease.

## 2024-08-14 LAB
BV BACTERIA DNA VAG QL NAA+PROBE: NEGATIVE
C GLABRATA DNA VAG QL NAA+PROBE: NEGATIVE
C KRUSEI DNA VAG QL NAA+PROBE: NEGATIVE
CANDIDA DNA VAG QL NAA+PROBE: NEGATIVE
T VAGINALIS DNA VAG QL NAA+PROBE: NEGATIVE

## 2024-08-21 ENCOUNTER — NURSE TRIAGE (OUTPATIENT)
Dept: INTERNAL MEDICINE CLINIC | Facility: CLINIC | Age: 34
End: 2024-08-21

## 2024-08-21 ENCOUNTER — TELEPHONE (OUTPATIENT)
Dept: INTERNAL MEDICINE CLINIC | Facility: CLINIC | Age: 34
End: 2024-08-21

## 2024-08-21 NOTE — TELEPHONE ENCOUNTER
Future Appointments   Date Time Provider Department Center   8/26/2024 11:00 AM Kia Thompson MD [facility]   12/4/2024  1:20 PM Kia Thompson MD [facility]      Reason for Disposition   Pencil-like, narrow stools    Answer Assessment - Initial Assessment Questions  1. STOOL PATTERN OR FREQUENCY: \"How often do you have a bowel movement (BM)?\"  (Normal range: 3 times a day to every 3 days)  \"When was your last BM?\"        Every 2 -3 days   2. STRAINING: \"Do you have to strain to have a BM?\"       Yes   3. RECTAL PAIN: \"Does your rectum hurt when the stool comes out?\" If Yes, ask: \"Do you have hemorrhoids? How bad is the pain?\"  (Scale 1-10; or mild, moderate, severe)      Mild   4. STOOL COMPOSITION: \"Are the stools hard?\"       Thin and soft   5. BLOOD ON STOOLS: \"Has there been any blood on the toilet tissue or on the surface of the BM?\" If Yes, ask: \"When was the last time?\"      No   6. CHRONIC CONSTIPATION: \"Is this a new problem for you?\"  If No, ask: \"How long have you had this problem?\" (days, weeks, months)       yes  7. CHANGES IN DIET OR HYDRATION: \"Have there been any recent changes in your diet?\" \"How much fluids are you drinking on a daily basis?\"  \"How much have you had to drink today?\"      Yes  60 ounces of water today  8. MEDICINES: \"Have you been taking any new medicines?\" \"Are you taking any narcotic pain medicines?\" (e.g., Dilaudid, morphine, Percocet, Vicodin)      No   9. LAXATIVES: \"Have you been using any stool softeners, laxatives, or enemas?\"  If Yes, ask \"What, how often, and when was the last time?\"      Fla seed and pineapple  tea  10. ACTIVITY:  \"How much walking do you do every day?\"  \"Has your activity level decreased in the past week?\"         3 times per week   11. CAUSE: \"What do you think is causing the constipation?\"         Unsure   12. OTHER SYMPTOMS: \"Do you have any other symptoms?\" (e.g., abdomen pain, bloating, fever, vomiting)         Stool have been thin pencil for 2 weeks /last stool 8/21/24/ bloating/ gas   13. MEDICAL HISTORY: \"Do you have a history of hemorrhoids, rectal fissures, or rectal surgery or rectal abscess?\"          No   14. PREGNANCY: \"Is there any chance you are pregnant?\" \"When was your last menstrual period?\"        7/26/24    Protocols used: Constipation-A-[place]

## 2024-08-21 NOTE — TELEPHONE ENCOUNTER
Future Appointments   Date Time Provider Department Center   8/26/2024 11:00 AM Kia Thompson MD [facility]   12/4/2024  1:20 PM Kia Thompson MD [facility]

## 2024-08-21 NOTE — TELEPHONE ENCOUNTER
Patient has been bloated and blood in stool, lots of gas, and constipation for the last few weeks. Patient would like a referral to a GI.

## 2024-08-26 ENCOUNTER — HOSPITAL ENCOUNTER (OUTPATIENT)
Dept: GENERAL RADIOLOGY | Facility: HOSPITAL | Age: 34
Discharge: HOME | End: 2024-08-26
Attending: FAMILY MEDICINE
Payer: COMMERCIAL

## 2024-08-26 ENCOUNTER — LAB ENCOUNTER (OUTPATIENT)
Dept: LAB | Facility: HOSPITAL | Age: 34
End: 2024-08-26
Attending: FAMILY MEDICINE
Payer: COMMERCIAL

## 2024-08-26 ENCOUNTER — OFFICE VISIT (OUTPATIENT)
Dept: INTERNAL MEDICINE CLINIC | Facility: CLINIC | Age: 34
End: 2024-08-26
Payer: COMMERCIAL

## 2024-08-26 VITALS
HEART RATE: 56 BPM | WEIGHT: 151 LBS | TEMPERATURE: 98 F | DIASTOLIC BLOOD PRESSURE: 70 MMHG | HEIGHT: 64.37 IN | BODY MASS INDEX: 25.78 KG/M2 | OXYGEN SATURATION: 98 % | SYSTOLIC BLOOD PRESSURE: 102 MMHG

## 2024-08-26 DIAGNOSIS — R10.30 LOWER ABDOMINAL PAIN: ICD-10-CM

## 2024-08-26 DIAGNOSIS — K59.09 CHRONIC CONSTIPATION: ICD-10-CM

## 2024-08-26 DIAGNOSIS — K59.09 CHRONIC CONSTIPATION: Primary | ICD-10-CM

## 2024-08-26 LAB
ALBUMIN SERPL-MCNC: 4.4 G/DL (ref 3.2–4.8)
ALBUMIN/GLOB SERPL: 1.7 {RATIO} (ref 1–2)
ALP LIVER SERPL-CCNC: 65 U/L
ALT SERPL-CCNC: 23 U/L
ANION GAP SERPL CALC-SCNC: 7 MMOL/L (ref 0–18)
AST SERPL-CCNC: 20 U/L (ref ?–34)
BASOPHILS # BLD AUTO: 0.05 X10(3) UL (ref 0–0.2)
BASOPHILS NFR BLD AUTO: 0.6 %
BILIRUB SERPL-MCNC: 0.5 MG/DL (ref 0.3–1.2)
BUN BLD-MCNC: 10 MG/DL (ref 9–23)
BUN/CREAT SERPL: 13.9 (ref 10–20)
CALCIUM BLD-MCNC: 9 MG/DL (ref 8.7–10.4)
CHLORIDE SERPL-SCNC: 108 MMOL/L (ref 98–112)
CO2 SERPL-SCNC: 27 MMOL/L (ref 21–32)
CREAT BLD-MCNC: 0.72 MG/DL
DEPRECATED RDW RBC AUTO: 42.6 FL (ref 35.1–46.3)
EGFRCR SERPLBLD CKD-EPI 2021: 113 ML/MIN/1.73M2 (ref 60–?)
EOSINOPHIL # BLD AUTO: 0.28 X10(3) UL (ref 0–0.7)
EOSINOPHIL NFR BLD AUTO: 3.6 %
ERYTHROCYTE [DISTWIDTH] IN BLOOD BY AUTOMATED COUNT: 13 % (ref 11–15)
FASTING STATUS PATIENT QL REPORTED: YES
GLOBULIN PLAS-MCNC: 2.6 G/DL (ref 2–3.5)
GLUCOSE BLD-MCNC: 77 MG/DL (ref 70–99)
HCT VFR BLD AUTO: 36.5 %
HGB BLD-MCNC: 12.8 G/DL
IGA SERPL-MCNC: 189 MG/DL (ref 70–312)
IMM GRANULOCYTES # BLD AUTO: 0.02 X10(3) UL (ref 0–1)
IMM GRANULOCYTES NFR BLD: 0.3 %
LYMPHOCYTES # BLD AUTO: 3.01 X10(3) UL (ref 1–4)
LYMPHOCYTES NFR BLD AUTO: 39 %
MCH RBC QN AUTO: 31.4 PG (ref 26–34)
MCHC RBC AUTO-ENTMCNC: 35.1 G/DL (ref 31–37)
MCV RBC AUTO: 89.5 FL
MONOCYTES # BLD AUTO: 0.65 X10(3) UL (ref 0.1–1)
MONOCYTES NFR BLD AUTO: 8.4 %
NEUTROPHILS # BLD AUTO: 3.71 X10 (3) UL (ref 1.5–7.7)
NEUTROPHILS # BLD AUTO: 3.71 X10(3) UL (ref 1.5–7.7)
NEUTROPHILS NFR BLD AUTO: 48.1 %
OSMOLALITY SERPL CALC.SUM OF ELEC: 292 MOSM/KG (ref 275–295)
PLATELET # BLD AUTO: 312 10(3)UL (ref 150–450)
POTASSIUM SERPL-SCNC: 4 MMOL/L (ref 3.5–5.1)
PROT SERPL-MCNC: 7 G/DL (ref 5.7–8.2)
RBC # BLD AUTO: 4.08 X10(6)UL
SODIUM SERPL-SCNC: 142 MMOL/L (ref 136–145)
TSI SER-ACNC: 1.16 MIU/ML (ref 0.55–4.78)
WBC # BLD AUTO: 7.7 X10(3) UL (ref 4–11)

## 2024-08-26 PROCEDURE — 85025 COMPLETE CBC W/AUTO DIFF WBC: CPT | Performed by: FAMILY MEDICINE

## 2024-08-26 PROCEDURE — 80053 COMPREHEN METABOLIC PANEL: CPT

## 2024-08-26 PROCEDURE — 84443 ASSAY THYROID STIM HORMONE: CPT

## 2024-08-26 PROCEDURE — 86003 ALLG SPEC IGE CRUDE XTRC EA: CPT

## 2024-08-26 PROCEDURE — 82785 ASSAY OF IGE: CPT

## 2024-08-26 PROCEDURE — 82784 ASSAY IGA/IGD/IGG/IGM EACH: CPT

## 2024-08-26 PROCEDURE — 74018 RADEX ABDOMEN 1 VIEW: CPT | Performed by: FAMILY MEDICINE

## 2024-08-26 PROCEDURE — 86364 TISS TRNSGLTMNASE EA IG CLAS: CPT

## 2024-08-26 PROCEDURE — 36415 COLL VENOUS BLD VENIPUNCTURE: CPT

## 2024-08-26 PROCEDURE — 99214 OFFICE O/P EST MOD 30 MIN: CPT | Performed by: FAMILY MEDICINE

## 2024-08-26 RX ORDER — HYDROCORTISONE 25 MG/G
1 CREAM TOPICAL 2 TIMES DAILY PRN
Qty: 28 G | Refills: 0 | Status: SHIPPED | OUTPATIENT
Start: 2024-08-26

## 2024-08-26 RX ORDER — DICYCLOMINE HCL 20 MG
20 TABLET ORAL EVERY 4 HOURS PRN
Qty: 20 TABLET | Refills: 0 | Status: SHIPPED | OUTPATIENT
Start: 2024-08-26

## 2024-08-26 RX ORDER — POLYETHYLENE GLYCOL 3350 17 G/17G
17 POWDER, FOR SOLUTION ORAL DAILY PRN
Qty: 116 G | Refills: 0 | Status: SHIPPED | OUTPATIENT
Start: 2024-08-26

## 2024-08-26 NOTE — PROGRESS NOTES
HPI:     Chief Complaint   Patient presents with    Constipation     Worsening     Bloating     Worsening     Rectal Bleeding     Worsening, while wiping.        Darby Khan is a 34 year old female presenting for:      GI issues for 2yrs intermittently but past 1mo has been having more freuquent sx  +abdominal pian mainly in LQ but also right quadrant. Pressure sensation. No cramping.Does not resolve with BM  Has been having pencil like stools majority of the time . Has BM Q3-4d for past 1mo. Prior was going Q1-2d. +straining.  Hard stools but sometimes can be loose stools.   Having intermittent blood in stool which occurs mostly with hard stools and when strains. Not mixed in stool. Mainly with wiping or in toilet water. Some rectal pain and itching  No n/v  Some GERD sx with relfux, bloating and gassyness  Working on dietary changes  Wants a food allergen test  Requesting GI referral    Results for orders placed or performed in visit on 08/13/24   Vaginitis Vaginosis PCR Panel    Specimen: Vaginal; Other   Result Value Ref Range    Bacterial Vaginosis Negative Negative    Candida group Negative Negative    Nakaseomyces glabrata (Candida glabrata) Negative Negative    Pichia kudriavzevii (Candida krusei) Negative Negative    Trichomonas vaginalis Negative Negative       Labs:   Lab Results   Component Value Date/Time    A1C 5.1 06/12/2024 07:55 AM      Lab Results   Component Value Date/Time    CHOLEST 209 (H) 06/12/2024 07:55 AM    HDL 55 06/12/2024 07:55 AM    TRIG 142 06/12/2024 07:55 AM     (H) 06/12/2024 07:55 AM    NONHDLC 154 (H) 06/12/2024 07:55 AM       Lab Results   Component Value Date/Time    GLU 87 06/12/2024 07:55 AM     06/12/2024 07:55 AM    K 3.9 06/12/2024 07:55 AM     06/12/2024 07:55 AM    CO2 27.0 06/12/2024 07:55 AM    CREATSERUM 0.74 06/12/2024 07:55 AM    CA 9.1 06/12/2024 07:55 AM    ALB 4.4 06/12/2024 07:55 AM    TP 6.9 06/12/2024 07:55 AM    ALKPHO 68 06/12/2024  07:55 AM    AST 17 06/12/2024 07:55 AM    ALT 16 06/12/2024 07:55 AM    BILT 0.6 06/12/2024 07:55 AM    TSH 1.521 06/12/2024 07:55 AM          Medications:  Current Outpatient Medications   Medication Sig Dispense Refill    polyethylene glycol, PEG 3350, 17 GM/SCOOP Oral Powder Take 17 g by mouth daily as needed (constipation). 116 g 0    dicyclomine 20 MG Oral Tab Take 1 tablet (20 mg total) by mouth every 4 (four) hours as needed (abdominal cramping). 20 tablet 0    hydrocortisone 2.5 % External Cream Place 1 Application rectally 2 (two) times daily as needed for Hemorrhoids. 28 g 0    Probiotic Product (PROBIOTIC DAILY OR) Take by mouth.      Ascorbic Acid (VITAMIN C) 100 MG Oral Tab Take 1 tablet (100 mg total) by mouth daily.      B Complex Vitamins (B COMPLEX 50 OR) Take by mouth.        Past Medical History:    Chronic tonsillitis         Past Surgical History:   Procedure Laterality Date    Removal of tonsils,13+ y/o       No Known Allergies   Social History:  Social History     Socioeconomic History    Marital status: Single   Tobacco Use    Smoking status: Never    Smokeless tobacco: Never   Substance and Sexual Activity    Alcohol use: Yes     Comment: social    Drug use: No      Family History:  Family History   Problem Relation Age of Onset    Diabetes Mother     Diabetes Maternal Grandmother     High Cholesterol Father     Heart Disease Maternal Grandfather           REVIEW OF SYSTEMS:   Review of Systems   Constitutional:  Negative for fatigue and fever.   Respiratory:  Negative for cough and shortness of breath.    Cardiovascular:  Negative for chest pain, palpitations and leg swelling.   Gastrointestinal:  Positive for abdominal pain, blood in stool and constipation. Negative for diarrhea and vomiting.   Musculoskeletal:  Negative for arthralgias.   Neurological:  Negative for headaches.            PHYSICAL EXAM:   /70   Pulse 56   Temp 97.5 °F (36.4 °C)   Ht 5' 4.37\" (1.635 m)   Wt 151 lb  (68.5 kg)   LMP 08/23/2024 (Approximate)   SpO2 98%   BMI 25.62 kg/m²  Estimated body mass index is 25.62 kg/m² as calculated from the following:    Height as of this encounter: 5' 4.37\" (1.635 m).    Weight as of this encounter: 151 lb (68.5 kg).     Wt Readings from Last 3 Encounters:   08/26/24 151 lb (68.5 kg)   08/13/24 149 lb (67.6 kg)   06/18/24 150 lb (68 kg)       Physical Exam  Vitals reviewed.   Constitutional:       General: She is not in acute distress.     Appearance: She is well-developed.   Cardiovascular:      Rate and Rhythm: Normal rate and regular rhythm.      Heart sounds: Normal heart sounds. No murmur heard.  Pulmonary:      Effort: Pulmonary effort is normal. No respiratory distress.      Breath sounds: Normal breath sounds.   Abdominal:      General: Bowel sounds are normal. There is no distension.      Palpations: Abdomen is soft. There is no mass.      Tenderness: There is no abdominal tenderness. There is no guarding or rebound.      Hernia: No hernia is present.   Musculoskeletal:      Right lower leg: No edema.      Left lower leg: No edema.   Neurological:      General: No focal deficit present.             ASSESSMENT AND PLAN:   Patient is a 34 year old female who presents primarily presents for:    Diagnoses and all orders for this visit:    Chronic constipation    -     CBC With Differential With Platelet  -     Comp Metabolic Panel (14); Future  -     TSH W Reflex To Free T4; Future  -     Adult Food Allergy Prof; Future  -     XR ABDOMEN (1 VIEW) (CPT=74018); Future  -     GASTRO - INTERNAL  -     polyethylene glycol, PEG 3350, 17 GM/SCOOP Oral Powder; Take 17 g by mouth daily as needed (constipation).  -     dicyclomine 20 MG Oral Tab; Take 1 tablet (20 mg total) by mouth every 4 (four) hours as needed (abdominal cramping).  -     hydrocortisone 2.5 % External Cream; Place 1 Application rectally 2 (two) times daily as needed for Hemorrhoids.  -     CELIAC DISEASE SCREEN Reflex  [E]; Future       -suspect IBS with a hemorrhoid given +straining from constipation  -Miralax, bentyl and preparation H given. Cotninue probiotics. May try IBGard  -FOD MAP diet handout provided  GI referral given per request      Return if symptoms worsen or fail to improve.  Patient indicates understanding of the above recommendations and agrees to the above plan.  Reasurrance and education provided. All questions answered.  Notified to call with any questions, complications, allergies, or worsening or changing symptoms as well as any side effects or complications from the treatments .  Red flags/ ER precautions discussed.    Spent 30 minutes including chart review, reviewing appropriate medical history, evaluating patient, discussing treatment options, counseling and education (diet and exercise), ordering appropriate diagnostic tests and completing documentation.        Meds & Refills for this Visit:  Requested Prescriptions     Signed Prescriptions Disp Refills    polyethylene glycol, PEG 3350, 17 GM/SCOOP Oral Powder 116 g 0     Sig: Take 17 g by mouth daily as needed (constipation).    dicyclomine 20 MG Oral Tab 20 tablet 0     Sig: Take 1 tablet (20 mg total) by mouth every 4 (four) hours as needed (abdominal cramping).    hydrocortisone 2.5 % External Cream 28 g 0     Sig: Place 1 Application rectally 2 (two) times daily as needed for Hemorrhoids.       Orders Placed This Encounter   Procedures    CBC With Differential With Platelet    Comp Metabolic Panel (14)    TSH W Reflex To Free T4    Adult Food Allergy Prof    CELIAC DISEASE SCREEN Reflex [E]       Imaging & Consults:  GASTRO - INTERNAL  XR ABDOMEN (1 VIEW) (CPT=74018)    Health Maintenance:  Health Maintenance   Topic Date Due    COVID-19 Vaccine (3 - 2023-24 season) 09/01/2023    Influenza Vaccine (1) 10/01/2024    Annual Physical  06/18/2025    Pap Smear  11/17/2026    DTaP,Tdap,and Td Vaccines (8 - Td or Tdap) 02/25/2031    Annual Depression  Screening  Completed    Pneumococcal Vaccine: Birth to 64yrs  Aged Out         Kia Nieves MD

## 2024-08-27 LAB
ALLERGEN BRAZIL NUT: <0.1 KUA/L (ref ?–0.1)
ALMOND IGE QN: <0.1 KUA/L (ref ?–0.1)
CASHEW NUT IGE QN: <0.1 KUA/L (ref ?–0.1)
CLAM IGE QN: <0.1 KUA/L (ref ?–0.1)
CODFISH IGE QN: <0.1 KUA/L (ref ?–0.1)
CORN IGE QN: <0.1 KUA/L (ref ?–0.1)
COW MILK IGE QN: <0.1 KUA/L (ref ?–0.1)
EGG WHITE IGE QN: <0.1 KUA/L (ref ?–0.1)
HAZELNUT IGE QN: <0.1 KUA/L (ref ?–0.1)
IGE SERPL-ACNC: 148 KU/L (ref 2–214)
PEANUT IGE QN: <0.1 KUA/L (ref ?–0.1)
SALMON IGE QN: <0.1 KUA/L (ref ?–0.1)
SCALLOP IGE QN: <0.1 KUA/L (ref ?–0.1)
SESAME SEED IGE QN: <0.1 KUA/L (ref ?–0.1)
SHRIMP IGE QN: <0.1 KUA/L (ref ?–0.1)
SOYBEAN IGE QN: <0.1 KUA/L (ref ?–0.1)
TTG IGA SER-ACNC: <0.2 U/ML (ref ?–7)
WALNUT IGE QN: <0.1 KUA/L (ref ?–0.1)
WHEAT IGE QN: <0.1 KUA/L (ref ?–0.1)

## 2025-01-22 ENCOUNTER — TELEPHONE (OUTPATIENT)
Dept: INTERNAL MEDICINE CLINIC | Facility: CLINIC | Age: 35
End: 2025-01-22

## 2025-01-22 ENCOUNTER — NURSE TRIAGE (OUTPATIENT)
Dept: INTERNAL MEDICINE CLINIC | Facility: CLINIC | Age: 35
End: 2025-01-22

## 2025-01-22 NOTE — TELEPHONE ENCOUNTER
Spoke with Darby who reports neck pain radiates down left arm for 2 months. She states she sits a a lot at her desk, so she is wondering if contributing to these symptoms. Patient has intermittent pain 6/10. Patient denied numbness or weakness. Patient can raise left arm overhead. Patient denied trauma. Patient does take Aleve which she does get temporary relief.    Patient requesting referral for physical therapy.    Disposition: Seen in office 3 days but there are no appointments in this time frame. RN advised to go to [facility]  but she declined this option. Darby requesting office visit with Dr. Durham.     RN scheduled Darby for 1/30/25 with Dr. Durham at 12 noon.     RN informed Darby if develops severer left arm pain, numbness, or weakness to go to the emergency department. Patient voiced understanding.     Reason for Disposition   MODERATE pain (e.g., interferes with normal activities) and present > 3 days    Answer Assessment - Initial Assessment Questions  1. ONSET: \"When did the pain start?\"      2 months  2. LOCATION: \"Where is the pain located?\"      Pain started neck and radiates down Left shoulder.  3. PAIN: \"How bad is the pain?\" (Scale 1-10; or mild, moderate, severe)    - MILD (1-3): doesn't interfere with normal activities    - MODERATE (4-7): interferes with normal activities (e.g., work or school) or awakens from sleep    - SEVERE (8-10): excruciating pain, unable to do any normal activities, unable to move arm at all due to pain      6/10  4. WORK OR EXERCISE: \"Has there been any recent work or exercise that involved this part of the body?\"      Unknown   5. CAUSE: \"What do you think is causing the shoulder pain?\"      Unknown   6. OTHER SYMPTOMS: \"Do you have any other symptoms?\" (e.g., neck pain, swelling, rash, fever, numbness, weakness)      Denied  7. PREGNANCY: \"Is there any chance you are pregnant?\" \"When was your last menstrual period?\"      Denied: LMP: 1/18/25    Protocols used:  Shoulder Pain-A-[place]

## 2025-01-22 NOTE — TELEPHONE ENCOUNTER
Pt called requesting a referral for physical therapy  Pt has been having pain on left shoulder for the past 2 months, on and off    Please advise

## 2025-01-30 ENCOUNTER — OFFICE VISIT (OUTPATIENT)
Dept: INTERNAL MEDICINE CLINIC | Facility: CLINIC | Age: 35
End: 2025-01-30
Payer: COMMERCIAL

## 2025-01-30 VITALS
HEART RATE: 94 BPM | WEIGHT: 150 LBS | TEMPERATURE: 98 F | OXYGEN SATURATION: 99 % | HEIGHT: 64.37 IN | SYSTOLIC BLOOD PRESSURE: 100 MMHG | DIASTOLIC BLOOD PRESSURE: 60 MMHG | BODY MASS INDEX: 25.61 KG/M2

## 2025-01-30 DIAGNOSIS — M62.830 SPASM OF LEFT TRAPEZIUS MUSCLE: Primary | ICD-10-CM

## 2025-01-30 PROCEDURE — 99214 OFFICE O/P EST MOD 30 MIN: CPT | Performed by: FAMILY MEDICINE

## 2025-01-30 RX ORDER — CYCLOBENZAPRINE HCL 5 MG
5 TABLET ORAL NIGHTLY PRN
Qty: 14 TABLET | Refills: 0 | Status: SHIPPED | OUTPATIENT
Start: 2025-01-30

## 2025-01-30 RX ORDER — METHYLPREDNISOLONE 4 MG/1
TABLET ORAL
Qty: 21 EACH | Refills: 0 | Status: SHIPPED | OUTPATIENT
Start: 2025-01-30

## 2025-01-30 NOTE — PROGRESS NOTES
HPI:     Chief Complaint   Patient presents with    Shoulder Pain    Neck Pain       Darby Khan is a 34 year old female presenting for:    Left shoulder  -starts at left nec, upper back, thur shoulder  -for about 2mo  -no trauma/injury  -sleeps on left side  -pain is intermittent; aggravated by movement  -gradaully worsening as more frequent   -right-handed  -intermittent n/t/w  -ROM causes discomofrt but able to do so  -has tried stretches and NSAIDs      Results for orders placed or performed in visit on 08/26/24   Comp Metabolic Panel (14)    Collection Time: 08/26/24 12:41 PM   Result Value Ref Range    Glucose 77 70 - 99 mg/dL    Sodium 142 136 - 145 mmol/L    Potassium 4.0 3.5 - 5.1 mmol/L    Chloride 108 98 - 112 mmol/L    CO2 27.0 21.0 - 32.0 mmol/L    Anion Gap 7 0 - 18 mmol/L    BUN 10 9 - 23 mg/dL    Creatinine 0.72 0.55 - 1.02 mg/dL    BUN/CREA Ratio 13.9 10.0 - 20.0    Calcium, Total 9.0 8.7 - 10.4 mg/dL    Calculated Osmolality 292 275 - 295 mOsm/kg    eGFR-Cr 113 >=60 mL/min/1.73m2    ALT 23 10 - 49 U/L    AST 20 <34 U/L    Alkaline Phosphatase 65 37 - 98 U/L    Bilirubin, Total 0.5 0.3 - 1.2 mg/dL    Total Protein 7.0 5.7 - 8.2 g/dL    Albumin 4.4 3.2 - 4.8 g/dL    Globulin  2.6 2.0 - 3.5 g/dL    A/G Ratio 1.7 1.0 - 2.0    Patient Fasting for CMP? Yes    TSH W Reflex To Free T4    Collection Time: 08/26/24 12:41 PM   Result Value Ref Range    TSH 1.158 0.550 - 4.780 mIU/mL   Adult Food Allergy Prof    Collection Time: 08/26/24 12:41 PM   Result Value Ref Range    Allergen Ama <0.10 <0.10 kUA/L    Allergen Brazil Nut <0.10 <0.10 kUA/L    Allergen Cashew <0.10 <0.10 kUA/L    Allergen Clam <0.10 <0.10 kUA/L    Allergen Corn <0.10 <0.10 kUA/L    Allergen Egg White <0.10 <0.10 kUA/L    Allergen Fish <0.10 <0.10 kUA/L    Allergen Hazelnut <0.10 <0.10 kUA/L    Allergen Milk <0.10 <0.10 kUA/L    Allergen Peanut <0.10 <0.10 kUA/L    Allergen [place] <0.10 <0.10 kUA/L    Allergen Scallop <0.10 <0.10  kUA/L    Allergen Sesame Seed <0.10 <0.10 kUA/L    Allergen Shrimp <0.10 <0.10 kUA/L    Allergen Soybean <0.10 <0.10 kUA/L    Allergen [place] <0.10 <0.10 kUA/L    Allergen Wheat <0.10 <0.10 kUA/L    Immunoglobulin E 148.00 2.00 - 214.00 kU/L   CELIAC DISEASE SCREEN Reflex [E]    Collection Time: 08/26/24 12:41 PM   Result Value Ref Range    Immunoglobulin A 189.00 70.00 - 312.00 mg/dL   Tissue Transglutaminase Ab, IgA    Collection Time: 08/26/24 12:41 PM   Result Value Ref Range    Tissue Transglutaminase IgA Ab <0.2 <7.0 U/mL       Labs:   Lab Results   Component Value Date/Time    A1C 5.1 06/12/2024 07:55 AM      Lab Results   Component Value Date/Time    CHOLEST 209 (H) 06/12/2024 07:55 AM    HDL 55 06/12/2024 07:55 AM    TRIG 142 06/12/2024 07:55 AM     (H) 06/12/2024 07:55 AM    NONHDLC 154 (H) 06/12/2024 07:55 AM       Lab Results   Component Value Date/Time    GLU 77 08/26/2024 12:41 PM     08/26/2024 12:41 PM    K 4.0 08/26/2024 12:41 PM     08/26/2024 12:41 PM    CO2 27.0 08/26/2024 12:41 PM    CREATSERUM 0.72 08/26/2024 12:41 PM    CA 9.0 08/26/2024 12:41 PM    ALB 4.4 08/26/2024 12:41 PM    TP 7.0 08/26/2024 12:41 PM    ALKPHO 65 08/26/2024 12:41 PM    AST 20 08/26/2024 12:41 PM    ALT 23 08/26/2024 12:41 PM    BILT 0.5 08/26/2024 12:41 PM    TSH 1.158 08/26/2024 12:41 PM          Medications:  Current Outpatient Medications   Medication Sig Dispense Refill    methylPREDNISolone (MEDROL) 4 MG Oral Tablet Therapy Pack As directed. 21 each 0    cyclobenzaprine 5 MG Oral Tab Take 1 tablet (5 mg total) by mouth nightly as needed. Muscle spasm 14 tablet 0    polyethylene glycol, PEG 3350, 17 GM/SCOOP Oral Powder Take 17 g by mouth daily as needed (constipation). 116 g 0    dicyclomine 20 MG Oral Tab Take 1 tablet (20 mg total) by mouth every 4 (four) hours as needed (abdominal cramping). 20 tablet 0    hydrocortisone 2.5 % External Cream Place 1 Application rectally 2 (two) times daily as  needed for Hemorrhoids. 28 g 0    Probiotic Product (PROBIOTIC DAILY OR) Take by mouth.      Ascorbic Acid (VITAMIN C) 100 MG Oral Tab Take 1 tablet (100 mg total) by mouth daily.      B Complex Vitamins (B COMPLEX 50 OR) Take by mouth.        Past Medical History:    Chronic tonsillitis         Past Surgical History:   Procedure Laterality Date    Removal of tonsils,13+ y/o       Allergies[1]   Social History:  Social History     Socioeconomic History    Marital status: Single   Tobacco Use    Smoking status: Never    Smokeless tobacco: Never   Vaping Use    Vaping status: Never Used   Substance and Sexual Activity    Alcohol use: Yes     Comment: social    Drug use: No      Family History:  Family History   Problem Relation Age of Onset    Diabetes Mother     Diabetes Maternal Grandmother     High Cholesterol Father     Heart Disease Maternal Grandfather           REVIEW OF SYSTEMS:   Review of Systems   Constitutional:  Negative for fatigue and fever.   Respiratory:  Negative for cough and shortness of breath.    Cardiovascular:  Negative for chest pain, palpitations and leg swelling.   Gastrointestinal:  Negative for abdominal pain, constipation, diarrhea and vomiting.   Musculoskeletal:  Positive for myalgias. Negative for arthralgias.   Neurological:  Negative for headaches.            PHYSICAL EXAM:   /60   Pulse 94   Temp 97.8 °F (36.6 °C)   Ht 5' 4.37\" (1.635 m)   Wt 150 lb (68 kg)   LMP 01/18/2025 (Approximate)   SpO2 99%   BMI 25.45 kg/m²  Estimated body mass index is 25.45 kg/m² as calculated from the following:    Height as of this encounter: 5' 4.37\" (1.635 m).    Weight as of this encounter: 150 lb (68 kg).     Wt Readings from Last 3 Encounters:   01/30/25 150 lb (68 kg)   08/26/24 151 lb (68.5 kg)   08/13/24 149 lb (67.6 kg)       Physical Exam  Vitals reviewed.   Constitutional:       General: She is not in acute distress.     Appearance: She is well-developed.   Cardiovascular:       Rate and Rhythm: Normal rate and regular rhythm.      Heart sounds: Normal heart sounds. No murmur heard.  Pulmonary:      Effort: Pulmonary effort is normal. No respiratory distress.      Breath sounds: Normal breath sounds.   Abdominal:      General: Bowel sounds are normal. There is no distension.      Palpations: Abdomen is soft.      Tenderness: There is no abdominal tenderness.   Musculoskeletal:         General: Tenderness (left trapexius) present. No swelling. Normal range of motion.      Right lower leg: No edema.      Left lower leg: No edema.   Neurological:      General: No focal deficit present.             ASSESSMENT AND PLAN:   Patient is a 34 year old female who presents primarily presents for:    Diagnoses and all orders for this visit:    Spasm of left trapezius muscle  -     Physical Therapy Referral - External  -     methylPREDNISolone (MEDROL) 4 MG Oral Tablet Therapy Pack; As directed.  -     cyclobenzaprine 5 MG Oral Tab; Take 1 tablet (5 mg total) by mouth nightly as needed. Muscle spasm     -supportive care PRN        Return if symptoms worsen or fail to improve.  Patient indicates understanding of the above recommendations and agrees to the above plan.  Reasurrance and education provided. All questions answered.  Notified to call with any questions, complications, allergies, or worsening or changing symptoms as well as any side effects or complications from the treatments .  Red flags/ ER precautions discussed.    Spent 30 minutes including chart review, reviewing appropriate medical history, evaluating patient, discussing treatment options, counseling and education (diet and exercise), ordering appropriate diagnostic tests and completing documentation.        Meds & Refills for this Visit:  Requested Prescriptions     Signed Prescriptions Disp Refills    methylPREDNISolone (MEDROL) 4 MG Oral Tablet Therapy Pack 21 each 0     Sig: As directed.    cyclobenzaprine 5 MG Oral Tab 14 tablet 0      Sig: Take 1 tablet (5 mg total) by mouth nightly as needed. Muscle spasm       No orders of the defined types were placed in this encounter.      Imaging & Consults:  PHYSICAL THERAPY EXTERNAL    Health Maintenance:  Health Maintenance   Topic Date Due    Annual Depression Screening  01/01/2025    Annual Physical  06/18/2025    Pap Smear  11/17/2026    DTaP,Tdap,and Td Vaccines (8 - Td or Tdap) 02/25/2031    Influenza Vaccine  Completed    COVID-19 Vaccine  Completed    Pneumococcal Vaccine: Birth to 50yrs  Aged Out    Meningococcal B Vaccine  Aged Out         Kia Nieves MD                  [1] No Known Allergies

## 2025-01-30 NOTE — PROGRESS NOTES
HPI:     Chief Complaint   Patient presents with   • Shoulder Pain   • Neck Pain       Darby Khan is a 34 year old female presenting for:  ***      Results for orders placed or performed in visit on 08/26/24   Comp Metabolic Panel (14)    Collection Time: 08/26/24 12:41 PM   Result Value Ref Range    Glucose 77 70 - 99 mg/dL    Sodium 142 136 - 145 mmol/L    Potassium 4.0 3.5 - 5.1 mmol/L    Chloride 108 98 - 112 mmol/L    CO2 27.0 21.0 - 32.0 mmol/L    Anion Gap 7 0 - 18 mmol/L    BUN 10 9 - 23 mg/dL    Creatinine 0.72 0.55 - 1.02 mg/dL    BUN/CREA Ratio 13.9 10.0 - 20.0    Calcium, Total 9.0 8.7 - 10.4 mg/dL    Calculated Osmolality 292 275 - 295 mOsm/kg    eGFR-Cr 113 >=60 mL/min/1.73m2    ALT 23 10 - 49 U/L    AST 20 <34 U/L    Alkaline Phosphatase 65 37 - 98 U/L    Bilirubin, Total 0.5 0.3 - 1.2 mg/dL    Total Protein 7.0 5.7 - 8.2 g/dL    Albumin 4.4 3.2 - 4.8 g/dL    Globulin  2.6 2.0 - 3.5 g/dL    A/G Ratio 1.7 1.0 - 2.0    Patient Fasting for CMP? Yes    TSH W Reflex To Free T4    Collection Time: 08/26/24 12:41 PM   Result Value Ref Range    TSH 1.158 0.550 - 4.780 mIU/mL   Adult Food Allergy Prof    Collection Time: 08/26/24 12:41 PM   Result Value Ref Range    Allergen [place] <0.10 <0.10 kUA/L    Allergen Brazil Nut <0.10 <0.10 kUA/L    Allergen Cashew <0.10 <0.10 kUA/L    Allergen Clam <0.10 <0.10 kUA/L    Allergen Corn <0.10 <0.10 kUA/L    Allergen Egg White <0.10 <0.10 kUA/L    Allergen Fish <0.10 <0.10 kUA/L    Allergen Hazelnut <0.10 <0.10 kUA/L    Allergen Milk <0.10 <0.10 kUA/L    Allergen Peanut <0.10 <0.10 kUA/L    Allergen [place] <0.10 <0.10 kUA/L    Allergen Scallop <0.10 <0.10 kUA/L    Allergen Sesame Seed <0.10 <0.10 kUA/L    Allergen Shrimp <0.10 <0.10 kUA/L    Allergen Soybean <0.10 <0.10 kUA/L    Allergen [place] <0.10 <0.10 kUA/L    Allergen Wheat <0.10 <0.10 kUA/L    Immunoglobulin E 148.00 2.00 - 214.00 kU/L   CELIAC DISEASE SCREEN Reflex [E]    Collection Time: 08/26/24 12:41 PM    Result Value Ref Range    Immunoglobulin A 189.00 70.00 - 312.00 mg/dL   Tissue Transglutaminase Ab, IgA    Collection Time: 08/26/24 12:41 PM   Result Value Ref Range    Tissue Transglutaminase IgA Ab <0.2 <7.0 U/mL       Labs:   Lab Results   Component Value Date/Time    A1C 5.1 06/12/2024 07:55 AM      Lab Results   Component Value Date/Time    CHOLEST 209 (H) 06/12/2024 07:55 AM    HDL 55 06/12/2024 07:55 AM    TRIG 142 06/12/2024 07:55 AM     (H) 06/12/2024 07:55 AM    NONHDLC 154 (H) 06/12/2024 07:55 AM       Lab Results   Component Value Date/Time    GLU 77 08/26/2024 12:41 PM     08/26/2024 12:41 PM    K 4.0 08/26/2024 12:41 PM     08/26/2024 12:41 PM    CO2 27.0 08/26/2024 12:41 PM    CREATSERUM 0.72 08/26/2024 12:41 PM    CA 9.0 08/26/2024 12:41 PM    ALB 4.4 08/26/2024 12:41 PM    TP 7.0 08/26/2024 12:41 PM    ALKPHO 65 08/26/2024 12:41 PM    AST 20 08/26/2024 12:41 PM    ALT 23 08/26/2024 12:41 PM    BILT 0.5 08/26/2024 12:41 PM    TSH 1.158 08/26/2024 12:41 PM          Medications:  Current Outpatient Medications   Medication Sig Dispense Refill   • polyethylene glycol, PEG 3350, 17 GM/SCOOP Oral Powder Take 17 g by mouth daily as needed (constipation). 116 g 0   • dicyclomine 20 MG Oral Tab Take 1 tablet (20 mg total) by mouth every 4 (four) hours as needed (abdominal cramping). 20 tablet 0   • hydrocortisone 2.5 % External Cream Place 1 Application rectally 2 (two) times daily as needed for Hemorrhoids. 28 g 0   • Probiotic Product (PROBIOTIC DAILY OR) Take by mouth.     • Ascorbic Acid (VITAMIN C) 100 MG Oral Tab Take 1 tablet (100 mg total) by mouth daily.     • B Complex Vitamins (B COMPLEX 50 OR) Take by mouth.        Past Medical History:   • Chronic tonsillitis         Past Surgical History:   Procedure Laterality Date   • Removal of tonsils,13+ y/o       Allergies[1]   Social History:  Social History     Socioeconomic History   • Marital status: Single   Tobacco Use   •  Smoking status: Never   • Smokeless tobacco: Never   Vaping Use   • Vaping status: Never Used   Substance and Sexual Activity   • Alcohol use: Yes     Comment: social   • Drug use: No      Family History:  Family History   Problem Relation Age of Onset   • Diabetes Mother    • Diabetes Maternal Grandmother    • High Cholesterol Father    • Heart Disease Maternal Grandfather           REVIEW OF SYSTEMS:   Review of Systems         PHYSICAL EXAM:   /60   Pulse 94   Temp 97.8 °F (36.6 °C)   Ht 5' 4.37\" (1.635 m)   Wt 150 lb (68 kg)   LMP 01/18/2025 (Approximate)   SpO2 99%   BMI 25.45 kg/m²  Estimated body mass index is 25.45 kg/m² as calculated from the following:    Height as of this encounter: 5' 4.37\" (1.635 m).    Weight as of this encounter: 150 lb (68 kg).     Wt Readings from Last 3 Encounters:   01/30/25 150 lb (68 kg)   08/26/24 151 lb (68.5 kg)   08/13/24 149 lb (67.6 kg)       Physical Exam        ASSESSMENT AND PLAN:   Patient is a 34 year old female who presents primarily presents for:    There are no diagnoses linked to this encounter.       No follow-ups on file.  Patient indicates understanding of the above recommendations and agrees to the above plan.  Reasurrance and education provided. All questions answered.  Notified to call with any questions, complications, allergies, or worsening or changing symptoms as well as any side effects or complications from the treatments .  Red flags/ ER precautions discussed.    Spent *** minutes including chart review, reviewing appropriate medical history, evaluating patient, discussing treatment options, counseling and education (diet and exercise), ordering appropriate diagnostic tests and completing documentation.        Meds & Refills for this Visit:  Requested Prescriptions      No prescriptions requested or ordered in this encounter       No orders of the defined types were placed in this encounter.      Imaging & Consults:  None    Health  Maintenance:  Health Maintenance   Topic Date Due   • Annual Depression Screening  01/01/2025   • Annual Physical  06/18/2025   • Pap Smear  11/17/2026   • DTaP,Tdap,and Td Vaccines (8 - Td or Tdap) 02/25/2031   • Influenza Vaccine  Completed   • COVID-19 Vaccine  Completed   • Pneumococcal Vaccine: Birth to 50yrs  Aged Out   • Meningococcal B Vaccine  Aged Out         Kia Nieves MD                  [1] No Known Allergies

## 2025-03-01 ENCOUNTER — OFFICE VISIT (OUTPATIENT)
Dept: OBGYN CLINIC | Facility: CLINIC | Age: 35
End: 2025-03-01

## 2025-03-01 VITALS
HEIGHT: 64 IN | HEART RATE: 48 BPM | BODY MASS INDEX: 25.27 KG/M2 | WEIGHT: 148 LBS | SYSTOLIC BLOOD PRESSURE: 111 MMHG | DIASTOLIC BLOOD PRESSURE: 74 MMHG

## 2025-03-01 DIAGNOSIS — N90.0 VIN I (VULVAR INTRAEPITHELIAL NEOPLASIA I): ICD-10-CM

## 2025-03-01 DIAGNOSIS — Z12.4 SCREENING FOR MALIGNANT NEOPLASM OF CERVIX: ICD-10-CM

## 2025-03-01 DIAGNOSIS — M62.89 PELVIC FLOOR DYSFUNCTION IN FEMALE: ICD-10-CM

## 2025-03-01 DIAGNOSIS — N89.8 VAGINAL DISCHARGE: Primary | ICD-10-CM

## 2025-03-01 PROCEDURE — 99213 OFFICE O/P EST LOW 20 MIN: CPT | Performed by: STUDENT IN AN ORGANIZED HEALTH CARE EDUCATION/TRAINING PROGRAM

## 2025-03-01 NOTE — PROGRESS NOTES
[facility]  Obstetrics and Gynecology  Gynecology Established Problem Exam    Chief Complaint   Patient presents with    Gyn Problem     Pt here for vulvar pain that covers all pelvic area states she gets in every couples of months with sensitivity.  Pt has had some discharge with odor and yellowish color to it           Darby Khan is a 34 year old female presenting for Gyn Problem (Pt here for vulvar pain that covers all pelvic area states she gets in every couples of months with sensitivity./Pt has had some discharge with odor and yellowish color to it)   .     Every few months feels like \"get punched\" from the mons/lower abdomen down around vagina. This is associated with discharge/odor/sensitivity. Most recently had 2 weeks ago. Reports accompanied by chills and urinary urge. No pain with urination. Doesn't feel like a UTI. Unclear if associated with menstrual cycle. When gets treated for BV doesn't seem to help. Questionably triggered by waxing.     Feels potentially triggered by a wax.     Periods regular every 28d lasting 5 days, moderate flow. No significant cramping.   Not sexually active.       Medications (Active prior to today's visit):  Current Outpatient Medications   Medication Sig Dispense Refill    cyclobenzaprine 5 MG Oral Tab Take 1 tablet (5 mg total) by mouth nightly as needed. Muscle spasm 14 tablet 0    polyethylene glycol, PEG 3350, 17 GM/SCOOP Oral Powder Take 17 g by mouth daily as needed (constipation). 116 g 0    dicyclomine 20 MG Oral Tab Take 1 tablet (20 mg total) by mouth every 4 (four) hours as needed (abdominal cramping). 20 tablet 0    Probiotic Product (PROBIOTIC DAILY OR) Take by mouth.      B Complex Vitamins (B COMPLEX 50 OR) Take by mouth.      methylPREDNISolone (MEDROL) 4 MG Oral Tablet Therapy Pack As directed. (Patient not taking: Reported on 3/1/2025) 21 each 0    Ascorbic Acid (VITAMIN C) 100 MG Oral Tab Take 1 tablet (100 mg total) by mouth daily. (Patient not taking:  Reported on 3/1/2025)       Allergies:  Allergies[1]  HISTORY:     OB History    Para Term  AB Living   0 0 0 0 0 0   SAB IAB Ectopic Multiple Live Births   0 0 0 0 0          Hx Prior Abnormal Pap: Yes  Pap Date: 21  Pap Result Notes: wnl      Past Medical History:    Chronic tonsillitis       Past Surgical History:   Procedure Laterality Date    Removal of tonsils,13+ y/o         Family History   Problem Relation Age of Onset    Diabetes Mother     Diabetes Maternal Grandmother     High Cholesterol Father     Heart Disease Maternal Grandfather        Social History     Socioeconomic History    Marital status: Single     Spouse name: Not on file    Number of children: Not on file    Years of education: Not on file    Highest education level: Not on file   Occupational History    Not on file   Tobacco Use    Smoking status: Never     Passive exposure: Never    Smokeless tobacco: Never   Vaping Use    Vaping status: Never Used   Substance and Sexual Activity    Alcohol use: Yes     Comment: social    Drug use: No    Sexual activity: Not Currently   Other Topics Concern    Not on file   Social History Narrative    Not on file     Social Drivers of Health     Food Insecurity: Not on file   Transportation Needs: Not on file   Stress: Not on file   Housing Stability: Not on file       ROS:   Review of Systems:    General: no fevers, chills, unintended weight loss/gain except as above  Cardiovascular: no chest pain, new or unexplained SOB except as above  Gastrointestinal: no nausea/vomiting, diarrhea, or blood in stool except as above  Respiratory: no new symptoms reported except as above  Skin: no new symptoms reported except as above  Psychiatric: no new symptoms reported except as above  PHYSICAL EXAM:   /74   Pulse (!) 48   Ht 5' 4\" (1.626 m)   Wt 148 lb (67.1 kg)   LMP 2025 (Approximate)   Breastfeeding No   BMI 25.40 kg/m²     Physical Exam  HENT:      Head: Normocephalic and  atraumatic.   Eyes:      Extraocular Movements: Extraocular movements intact.   Pulmonary:      Effort: Pulmonary effort is normal.   Abdominal:      Palpations: Abdomen is soft.   Genitourinary:     Comments:   Normal cervix, normal mucous discharge, no odor. Levator tenderness to palpation.   Musculoskeletal:      Cervical back: Normal range of motion.   Skin:     General: Skin is warm and dry.   Neurological:      General: No focal deficit present.      Mental Status: She is alert. Mental status is at baseline.   Psychiatric:         Behavior: Behavior normal.         Thought Content: Thought content normal.         RESULTS & IMAGING     06/2023 Vaginosis: Negative    08/2024 Vaginitis/Vaginosis: Negative    No results for input(s): \"URINEPREG\" in the last 72 hours.    No results for input(s): \"PGLU\", \"POCTGLUCOSE\" in the last 72 hours.    No results for input(s): \"GLUCOSEDIP\", \"BILIRUBIN\", \"KETONESDIP\", \"BLOODU\", \"PHURINE\", \"UROBILIN\", \"NITRITE\", \"LEUKOCYTES\", \"APPEARANCE\", \"URINECOLOR\" in the last 72 hours.    Invalid input(s): \"SPECGRAV\"       ASSESSMENT & PLAN     Vaginal discharge (Primary)  -     Vaginitis Vaginosis PCR Panel; Future; Expected date: 03/01/2025  -     Genital Mycoplasma Profile, IRVIN; Future; Expected date: 03/01/2025  -     Vaginitis Vaginosis PCR Panel  -     Genital Mycoplasma Profile, IRVIN  Pelvic floor dysfunction in female  -     Pelvic Floor Therapy - [facility]  Screening for malignant neoplasm of cervix  -     ThinPrep PAP Smear B; Future; Expected date: 03/01/2025  -     Hpv High Risk , Thin Prep Collect; Future; Expected date: 03/01/2025  -     ThinPrep PAP Smear B  -     Hpv High Risk , Thin Prep Collect  -     THINPREP PAP SMEAR ONLY    Vaginitis + ureaplasma sent  PFPT for pain      Milagro Orellana MD  3/1/2025  11:53 AM             [1] No Known Allergies

## 2025-03-03 LAB — HPV E6+E7 MRNA CVX QL NAA+PROBE: NEGATIVE

## 2025-03-07 LAB
MYCOPLASMA GENITALIUM NAA: NEGATIVE
MYCOPLASMA GENITALIUM NAA: NEGATIVE
MYCOPLASMA HOMINIS NAA: NEGATIVE
MYCOPLASMA HOMINIS NAA: NEGATIVE
UREAPLASMA SPP NAA: POSITIVE
UREAPLASMA SPP NAA: POSITIVE

## 2025-03-14 PROBLEM — N89.8 VAGINAL DISCHARGE: Status: ACTIVE | Noted: 2025-03-14

## (undated) NOTE — MR AVS SNAPSHOT
After Visit Summary   11/17/2021    Yaritza Bañuelos   MRN: [id number]           Visit Information     Date & Time  11/17/2021  4:20 PM Provider  [address] Bell [address], [facility] Dept. Instructions    Return if symptoms worsen or fail to improve. Did you know that [facility] primary care physicians now offer Video Visits through [address] for adult patients for a variety of conditions such as allergies, back pain and cold symptoms?